# Patient Record
Sex: FEMALE | Race: ASIAN | NOT HISPANIC OR LATINO | Employment: FULL TIME | URBAN - METROPOLITAN AREA
[De-identification: names, ages, dates, MRNs, and addresses within clinical notes are randomized per-mention and may not be internally consistent; named-entity substitution may affect disease eponyms.]

---

## 2020-09-11 ENCOUNTER — EH NON-PROVIDER (OUTPATIENT)
Dept: OCCUPATIONAL MEDICINE | Facility: CLINIC | Age: 40
End: 2020-09-11

## 2020-09-11 ENCOUNTER — HOSPITAL ENCOUNTER (OUTPATIENT)
Facility: MEDICAL CENTER | Age: 40
End: 2020-09-11
Attending: NURSE PRACTITIONER
Payer: COMMERCIAL

## 2020-09-11 ENCOUNTER — EMPLOYEE HEALTH (OUTPATIENT)
Dept: OCCUPATIONAL MEDICINE | Facility: CLINIC | Age: 40
End: 2020-09-11

## 2020-09-11 VITALS
HEIGHT: 62 IN | WEIGHT: 149 LBS | TEMPERATURE: 97.9 F | SYSTOLIC BLOOD PRESSURE: 114 MMHG | OXYGEN SATURATION: 95 % | RESPIRATION RATE: 14 BRPM | DIASTOLIC BLOOD PRESSURE: 76 MMHG | BODY MASS INDEX: 27.42 KG/M2 | HEART RATE: 84 BPM

## 2020-09-11 DIAGNOSIS — Z02.1 PRE-EMPLOYMENT HEALTH SCREENING EXAMINATION: ICD-10-CM

## 2020-09-11 DIAGNOSIS — Z02.1 PRE-EMPLOYMENT HEALTH SCREENING EXAMINATION: Primary | ICD-10-CM

## 2020-09-11 DIAGNOSIS — Z02.1 PRE-EMPLOYMENT DRUG SCREENING: ICD-10-CM

## 2020-09-11 LAB
AMP AMPHETAMINE: NORMAL
BAR BARBITURATES: NORMAL
BZO BENZODIAZEPINES: NORMAL
COC COCAINE: NORMAL
INT CON NEG: NORMAL
INT CON POS: NORMAL
MDMA ECSTASY: NORMAL
MET METHAMPHETAMINES: NORMAL
MTD METHADONE: NORMAL
OPI OPIATES: NORMAL
OXY OXYCODONE: NORMAL
PCP PHENCYCLIDINE: NORMAL
POC URINE DRUG SCREEN OCDRS: NEGATIVE
THC: NORMAL

## 2020-09-11 PROCEDURE — 80305 DRUG TEST PRSMV DIR OPT OBS: CPT | Performed by: NURSE PRACTITIONER

## 2020-09-11 PROCEDURE — 94375 RESPIRATORY FLOW VOLUME LOOP: CPT | Performed by: NURSE PRACTITIONER

## 2020-09-11 PROCEDURE — 8915 PR COMPREHENSIVE PHYSICAL: Performed by: NURSE PRACTITIONER

## 2020-09-11 PROCEDURE — 90707 MMR VACCINE SC: CPT | Performed by: NURSE PRACTITIONER

## 2020-09-11 PROCEDURE — 86480 TB TEST CELL IMMUN MEASURE: CPT | Performed by: NURSE PRACTITIONER

## 2020-09-11 ASSESSMENT — VISUAL ACUITY
OD_CC: 20/20
OS_CC: 20/20

## 2020-09-14 LAB
GAMMA INTERFERON BACKGROUND BLD IA-ACNC: 0.04 IU/ML
M TB IFN-G BLD-IMP: NEGATIVE
M TB IFN-G CD4+ BCKGRND COR BLD-ACNC: 0.04 IU/ML
MITOGEN IGNF BCKGRD COR BLD-ACNC: 6.49 IU/ML
QFT TB2 - NIL TBQ2: 0.02 IU/ML

## 2020-09-21 ENCOUNTER — EH NON-PROVIDER (OUTPATIENT)
Dept: OCCUPATIONAL MEDICINE | Facility: CLINIC | Age: 40
End: 2020-09-21

## 2020-09-21 DIAGNOSIS — Z71.85 IMMUNIZATION COUNSELING: ICD-10-CM

## 2020-09-21 PROCEDURE — 99999 PR NO CHARGE: CPT | Performed by: PREVENTIVE MEDICINE

## 2020-10-02 ENCOUNTER — IMMUNIZATION (OUTPATIENT)
Dept: OCCUPATIONAL MEDICINE | Facility: CLINIC | Age: 40
End: 2020-10-02

## 2020-10-02 DIAGNOSIS — Z23 NEED FOR VACCINATION: ICD-10-CM

## 2020-10-02 PROCEDURE — 90686 IIV4 VACC NO PRSV 0.5 ML IM: CPT | Performed by: PREVENTIVE MEDICINE

## 2020-12-17 DIAGNOSIS — Z23 NEED FOR VACCINATION: ICD-10-CM

## 2020-12-31 ENCOUNTER — IMMUNIZATION (OUTPATIENT)
Dept: FAMILY PLANNING/WOMEN'S HEALTH CLINIC | Facility: IMMUNIZATION CENTER | Age: 40
End: 2020-12-31
Attending: FAMILY MEDICINE

## 2020-12-31 DIAGNOSIS — Z23 ENCOUNTER FOR VACCINATION: Primary | ICD-10-CM

## 2020-12-31 DIAGNOSIS — Z23 NEED FOR VACCINATION: ICD-10-CM

## 2020-12-31 PROCEDURE — 91301 MODERNA SARS-COV-2 VACCINE: CPT

## 2020-12-31 PROCEDURE — 0011A MODERNA SARS-COV-2 VACCINE: CPT

## 2021-01-30 PROCEDURE — 91301 MODERNA SARS-COV-2 VACCINE: CPT

## 2021-01-30 PROCEDURE — 0012A MODERNA SARS-COV-2 VACCINE: CPT

## 2021-01-31 ENCOUNTER — OFFICE VISIT (OUTPATIENT)
Dept: URGENT CARE | Facility: CLINIC | Age: 41
End: 2021-01-31
Payer: COMMERCIAL

## 2021-01-31 ENCOUNTER — IMMUNIZATION (OUTPATIENT)
Dept: FAMILY PLANNING/WOMEN'S HEALTH CLINIC | Facility: IMMUNIZATION CENTER | Age: 41
End: 2021-01-31
Payer: COMMERCIAL

## 2021-01-31 ENCOUNTER — HOSPITAL ENCOUNTER (OUTPATIENT)
Facility: MEDICAL CENTER | Age: 41
End: 2021-01-31
Attending: NURSE PRACTITIONER
Payer: COMMERCIAL

## 2021-01-31 VITALS
BODY MASS INDEX: 26.31 KG/M2 | OXYGEN SATURATION: 97 % | WEIGHT: 143 LBS | SYSTOLIC BLOOD PRESSURE: 122 MMHG | HEIGHT: 62 IN | TEMPERATURE: 98.3 F | RESPIRATION RATE: 16 BRPM | DIASTOLIC BLOOD PRESSURE: 86 MMHG | HEART RATE: 105 BPM

## 2021-01-31 DIAGNOSIS — N76.0 ACUTE VAGINITIS: ICD-10-CM

## 2021-01-31 DIAGNOSIS — R50.9 FEVER, UNSPECIFIED FEVER CAUSE: ICD-10-CM

## 2021-01-31 DIAGNOSIS — R39.9 UTI SYMPTOMS: ICD-10-CM

## 2021-01-31 DIAGNOSIS — J02.9 PHARYNGITIS, UNSPECIFIED ETIOLOGY: ICD-10-CM

## 2021-01-31 DIAGNOSIS — Z23 ENCOUNTER FOR VACCINATION: Primary | ICD-10-CM

## 2021-01-31 DIAGNOSIS — R11.0 NAUSEA: ICD-10-CM

## 2021-01-31 LAB
APPEARANCE UR: CLEAR
BILIRUB UR STRIP-MCNC: NORMAL MG/DL
COLOR UR AUTO: YELLOW
GLUCOSE UR STRIP.AUTO-MCNC: NEGATIVE MG/DL
INT CON NEG: NEGATIVE
INT CON NEG: NORMAL
INT CON POS: NORMAL
INT CON POS: POSITIVE
KETONES UR STRIP.AUTO-MCNC: NEGATIVE MG/DL
LEUKOCYTE ESTERASE UR QL STRIP.AUTO: NEGATIVE
NITRITE UR QL STRIP.AUTO: NEGATIVE
PH UR STRIP.AUTO: 5.5 [PH] (ref 5–8)
POC URINE PREGNANCY TEST: NEGATIVE
PROT UR QL STRIP: NORMAL MG/DL
RBC UR QL AUTO: NORMAL
S PYO AG THROAT QL: NEGATIVE
SP GR UR STRIP.AUTO: 1.03
UROBILINOGEN UR STRIP-MCNC: 0.2 MG/DL

## 2021-01-31 PROCEDURE — 87660 TRICHOMONAS VAGIN DIR PROBE: CPT

## 2021-01-31 PROCEDURE — U0003 INFECTIOUS AGENT DETECTION BY NUCLEIC ACID (DNA OR RNA); SEVERE ACUTE RESPIRATORY SYNDROME CORONAVIRUS 2 (SARS-COV-2) (CORONAVIRUS DISEASE [COVID-19]), AMPLIFIED PROBE TECHNIQUE, MAKING USE OF HIGH THROUGHPUT TECHNOLOGIES AS DESCRIBED BY CMS-2020-01-R: HCPCS

## 2021-01-31 PROCEDURE — 81002 URINALYSIS NONAUTO W/O SCOPE: CPT | Performed by: NURSE PRACTITIONER

## 2021-01-31 PROCEDURE — 87880 STREP A ASSAY W/OPTIC: CPT | Performed by: NURSE PRACTITIONER

## 2021-01-31 PROCEDURE — 87086 URINE CULTURE/COLONY COUNT: CPT

## 2021-01-31 PROCEDURE — U0005 INFEC AGEN DETEC AMPLI PROBE: HCPCS

## 2021-01-31 PROCEDURE — 87510 GARDNER VAG DNA DIR PROBE: CPT

## 2021-01-31 PROCEDURE — 99204 OFFICE O/P NEW MOD 45 MIN: CPT | Performed by: NURSE PRACTITIONER

## 2021-01-31 PROCEDURE — 81025 URINE PREGNANCY TEST: CPT | Performed by: NURSE PRACTITIONER

## 2021-01-31 PROCEDURE — 87077 CULTURE AEROBIC IDENTIFY: CPT

## 2021-01-31 PROCEDURE — 87480 CANDIDA DNA DIR PROBE: CPT

## 2021-01-31 RX ORDER — ONDANSETRON 4 MG/1
4 TABLET, ORALLY DISINTEGRATING ORAL EVERY 6 HOURS PRN
Qty: 10 TAB | Refills: 0 | Status: SHIPPED | OUTPATIENT
Start: 2021-01-31 | End: 2021-03-24

## 2021-01-31 ASSESSMENT — ENCOUNTER SYMPTOMS
NAUSEA: 1
FEVER: 1
SHORTNESS OF BREATH: 0
ABDOMINAL PAIN: 0
BACK PAIN: 1
CHILLS: 1
MYALGIAS: 1
COUGH: 0
FATIGUE: 1
FLANK PAIN: 1
DIARRHEA: 1
RESPIRATORY NEGATIVE: 1
SORE THROAT: 1

## 2021-01-31 ASSESSMENT — VISUAL ACUITY: OU: 1

## 2021-01-31 NOTE — PROGRESS NOTES
"Subjective:     Mariela Kerr is a 40 y.o. female who presents for UTI (x 1 month.  Burning with uriantion, chills, fever (103), R flank pain, \" fishy smell\",  R pelvic pain, urgency and frequency.  Hx of UTI's and BV. ) and Sore Throat (x 1 day, cough, congestion, bodyaches.  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  She received her 2nd shot yesterday. )       UTI  This is a new problem. Episode onset: 1 month ago. The problem has been gradually worsening. Associated symptoms include chills, congestion, fatigue, a fever, myalgias, nausea, a sore throat (Right) and urinary symptoms. Pertinent negatives include no abdominal pain or coughing. Associated symptoms comments: Vaginal odor, R flank pain.   Pharyngitis   This is a new problem. The current episode started today. The problem has been gradually worsening. Associated symptoms include congestion and diarrhea. Pertinent negatives include no abdominal pain, coughing or shortness of breath. Treatments tried: DayQuil/NyQuil.      Patient reports a history of BV as well as kidney infection. Denies concern for STDs.    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    Patient had her second dose of Moderna vaccine yesterday.  Had her first dose 12/31/2020.    Patient had her flu shot 10/2/2020.    Patient was screened prior to rooming and denied COVID-19 diagnosis or contact with a person who has been diagnosed or is suspected to have COVID-19. During this visit, appropriate PPE was worn, hand hygiene was performed, and the patient and any visitors were masked.     PMH:  has no past medical history on file.    MEDS:   Current Outpatient Medications:   •  ondansetron (ZOFRAN ODT) 4 MG TABLET DISPERSIBLE, Take 1 Tab by mouth every 6 hours as needed for Nausea. Dissolve in mouth., Disp: 10 Tab, Rfl: 0    ALLERGIES: No Known Allergies    SURGHX: History reviewed. No pertinent surgical history.    SOCHX:  reports that she has never smoked. She has never " "used smokeless tobacco. She reports current alcohol use. She reports previous drug use.     FH: Reviewed with patient, not pertinent to this visit.    Review of Systems   Constitutional: Positive for chills, fatigue, fever and malaise/fatigue.   HENT: Positive for congestion and sore throat (Right).    Respiratory: Negative.  Negative for cough and shortness of breath.    Gastrointestinal: Positive for diarrhea and nausea. Negative for abdominal pain.   Genitourinary: Positive for dysuria, flank pain (Right), frequency and urgency.        Vaginal odor   Musculoskeletal: Positive for back pain and myalgias.   All other systems reviewed and are negative.    Additional details per HPI.      Objective:     /86   Pulse (!) 105   Temp 36.8 °C (98.3 °F) (Temporal)   Resp 16   Ht 1.575 m (5' 2\")   Wt 64.9 kg (143 lb)   LMP 01/17/2021   SpO2 97%   BMI 26.16 kg/m²     Physical Exam  Vitals signs reviewed.   Constitutional:       General: She is not in acute distress.     Appearance: She is well-developed. She is ill-appearing. She is not toxic-appearing.   HENT:      Head: Normocephalic.      Right Ear: External ear normal.      Left Ear: External ear normal.      Nose: Nose normal.      Mouth/Throat:      Mouth: Mucous membranes are moist.      Pharynx: Oropharynx is clear. Uvula midline.   Eyes:      General: Vision grossly intact.      Extraocular Movements: Extraocular movements intact.      Conjunctiva/sclera: Conjunctivae normal.   Neck:      Musculoskeletal: Normal range of motion.   Cardiovascular:      Rate and Rhythm: Regular rhythm. Tachycardia present.      Pulses: Normal pulses.      Heart sounds: Normal heart sounds.   Pulmonary:      Effort: Pulmonary effort is normal. No respiratory distress.      Breath sounds: Normal breath sounds. No decreased breath sounds, wheezing, rhonchi or rales.   Abdominal:      Palpations: Abdomen is soft.      Tenderness: There is no abdominal tenderness. There is no " right CVA tenderness or left CVA tenderness.   Musculoskeletal: Normal range of motion.         General: No deformity.   Lymphadenopathy:      Cervical: No cervical adenopathy.   Skin:     General: Skin is warm and dry.      Coloration: Skin is not pale.   Neurological:      Mental Status: She is alert and oriented to person, place, and time.      Sensory: No sensory deficit.      Motor: No weakness.      Coordination: Coordination normal.   Psychiatric:         Behavior: Behavior normal. Behavior is cooperative.     UA: moderate blood, small bili, trace protein    POCT pregnancy: negative    Rapid Strep A swab: negative      Assessment/Plan:     1. UTI symptoms  - POCT Urinalysis  - URINE CULTURE(NEW); Future  - POCT PREGNANCY    2. Acute vaginitis  - VAGINAL PATHOGENS DNA PANEL; Future    3. Fever, unspecified fever cause  - COVID/SARS CoV-2 PCR; Future    4. Pharyngitis, unspecified etiology  - POCT Rapid Strep A    5. Nausea  - ondansetron (ZOFRAN ODT) 4 MG TABLET DISPERSIBLE; Take 1 Tab by mouth every 6 hours as needed for Nausea. Dissolve in mouth.  Dispense: 10 Tab; Refill: 0    , otherwise vital signs stable, asymptomatic, no acute distress at this time.     Fever, aches, and fatigue likely related to side effect of 2nd dose of Moderna vaccine yesterday.    Will test for COVID-19. Swab pending. Patient is advised to self-isolate as recommended by the CDC.    The CDC recommends that any person who has symptoms of COVID-19 self-isolates until 1) at least 10 days have elapsed since symptom onset, and 2) at least 24 hours have passed since resolution of any fever without use of fever-reducing medications, and 3) other symptoms have improved.    If results are positive, the health department should be consulted for further instructions. Otherwise, follow the CDC self-isolation criteria stated above.    If results are negative and there is exposure to COVID-19, the CDC recommends self-isolation for 14 days  after last exposure.    If results are negative and there is no exposure to COVID-19, the patient may return to work, school, or regular activities after symptoms have fully resolved for at least 24 hours.    In general, repeat testing is not necessary and is not offered in our urgent cares.     Differential diagnosis, natural history, supportive care, rest, increased fluids, over-the-counter symptom management per 's instructions, ibuprofen, APAP, close monitoring, and indications for immediate follow-up discussed.     Patient advised to: Return for 1) Symptoms that worsen/don't improve, or go to ER, 2) Follow up with primary care in 7-10 days.    Discharge summary provided. Work note provided.    Patient is signed up for [employer] and approves of electronic communications.    All questions answered. Patient agrees with the plan of care.

## 2021-01-31 NOTE — LETTER
January 31, 2021         Patient: Mariela Kerr   YOB: 1980   Date of Visit: 1/31/2021         To Whom it May Concern:    Mariela Kerr was seen in my clinic on 1/31/2021.    COVID-19 test pending. Patient is advised to self-isolate as recommended by the CDC.    The CDC recommends that any person who has symptoms of COVID-19 self-isolates until 1) at least 10 days have elapsed since symptom onset, and 2) at least 24 hours have passed since resolution of any fever without use of fever-reducing medications, and 3) other symptoms have improved.    If results are positive, the health department should be consulted for further instructions. Otherwise, follow the CDC self-isolation criteria stated above.    If results are negative and there is exposure to COVID-19, the CDC recommends self-isolation for 14 days after last exposure.    If results are negative and there is no exposure to COVID-19, the patient may return to work, school, or regular activities after symptoms have fully resolved for at least 24 hours.    In general, repeat testing is not necessary and is not offered in our urgent cares.     If you have any questions or concerns, please don't hesitate to call.        Sincerely,         DAYTON Stern.  Electronically Signed

## 2021-01-31 NOTE — PATIENT INSTRUCTIONS
COVID-19 test pending. Patient is advised to self-isolate as recommended by the CDC.    The CDC recommends that any person who has symptoms of COVID-19 self-isolates until 1) at least 10 days have elapsed since symptom onset, and 2) at least 24 hours have passed since resolution of any fever without use of fever-reducing medications, and 3) other symptoms have improved.    If results are positive, the health department should be consulted for further instructions. Otherwise, follow the CDC self-isolation criteria stated above.    If results are negative and there is exposure to COVID-19, the CDC recommends self-isolation for 14 days after last exposure.    If results are negative and there is no exposure to COVID-19, the patient may return to work, school, or regular activities after symptoms have fully resolved for at least 24 hours.    In general, repeat testing is not necessary and is not offered in our urgent cares.       COVID-19  COVID-19 is a respiratory infection that is caused by a virus called severe acute respiratory syndrome coronavirus 2 (SARS-CoV-2). The disease is also known as coronavirus disease or novel coronavirus. In some people, the virus may not cause any symptoms. In others, it may cause a serious infection. The infection can get worse quickly and can lead to complications, such as:  · Pneumonia, or infection of the lungs.  · Acute respiratory distress syndrome or ARDS. This is fluid build-up in the lungs.  · Acute respiratory failure. This is a condition in which there is not enough oxygen passing from the lungs to the body.  · Sepsis or septic shock. This is a serious bodily reaction to an infection.  · Blood clotting problems.  · Secondary infections due to bacteria or fungus.  The virus that causes COVID-19 is contagious. This means that it can spread from person to person through droplets from coughs and sneezes (respiratory secretions).  What are the causes?  This illness is caused by a  virus. You may catch the virus by:  · Breathing in droplets from an infected person's cough or sneeze.  · Touching something, like a table or a doorknob, that was exposed to the virus (contaminated) and then touching your mouth, nose, or eyes.  What increases the risk?  Risk for infection  You are more likely to be infected with this virus if you:  · Live in or travel to an area with a COVID-19 outbreak.  · Come in contact with a sick person who recently traveled to an area with a COVID-19 outbreak.  · Provide care for or live with a person who is infected with COVID-19.  Risk for serious illness  You are more likely to become seriously ill from the virus if you:  · Are 65 years of age or older.  · Have a long-term disease that lowers your body's ability to fight infection (immunocompromised).  · Live in a nursing home or long-term care facility.  · Have a long-term (chronic) disease such as:  ? Chronic lung disease, including chronic obstructive pulmonary disease or asthma  ? Heart disease.  ? Diabetes.  ? Chronic kidney disease.  ? Liver disease.  · Are obese.  What are the signs or symptoms?  Symptoms of this condition can range from mild to severe. Symptoms may appear any time from 2 to 14 days after being exposed to the virus. They include:  · A fever.  · A cough.  · Difficulty breathing.  · Chills.  · Muscle pains.  · A sore throat.  · Loss of taste or smell.  Some people may also have stomach problems, such as nausea, vomiting, or diarrhea.  Other people may not have any symptoms of COVID-19.  How is this diagnosed?  This condition may be diagnosed based on:  · Your signs and symptoms, especially if:  ? You live in an area with a COVID-19 outbreak.  ? You recently traveled to or from an area where the virus is common.  ? You provide care for or live with a person who was diagnosed with COVID-19.  · A physical exam.  · Lab tests, which may include:  ? A nasal swab to take a sample of fluid from your nose.  ? A  throat swab to take a sample of fluid from your throat.  ? A sample of mucus from your lungs (sputum).  ? Blood tests.  · Imaging tests, which may include, X-rays, CT scan, or ultrasound.  How is this treated?  At present, there is no medicine to treat COVID-19. Medicines that treat other diseases are being used on a trial basis to see if they are effective against COVID-19.  Your health care provider will talk with you about ways to treat your symptoms. For most people, the infection is mild and can be managed at home with rest, fluids, and over-the-counter medicines.  Treatment for a serious infection usually takes places in a hospital intensive care unit (ICU). It may include one or more of the following treatments. These treatments are given until your symptoms improve.  · Receiving fluids and medicines through an IV.  · Supplemental oxygen. Extra oxygen is given through a tube in the nose, a face mask, or a hill.  · Positioning you to lie on your stomach (prone position). This makes it easier for oxygen to get into the lungs.  · Continuous positive airway pressure (CPAP) or bi-level positive airway pressure (BPAP) machine. This treatment uses mild air pressure to keep the airways open. A tube that is connected to a motor delivers oxygen to the body.  · Ventilator. This treatment moves air into and out of the lungs by using a tube that is placed in your windpipe.  · Tracheostomy. This is a procedure to create a hole in the neck so that a breathing tube can be inserted.  · Extracorporeal membrane oxygenation (ECMO). This procedure gives the lungs a chance to recover by taking over the functions of the heart and lungs. It supplies oxygen to the body and removes carbon dioxide.  Follow these instructions at home:  Lifestyle  · If you are sick, stay home except to get medical care. Your health care provider will tell you how long to stay home. Call your health care provider before you go for medical care.  · Rest at  home as told by your health care provider.  · Do not use any products that contain nicotine or tobacco, such as cigarettes, e-cigarettes, and chewing tobacco. If you need help quitting, ask your health care provider.  · Return to your normal activities as told by your health care provider. Ask your health care provider what activities are safe for you.  General instructions  · Take over-the-counter and prescription medicines only as told by your health care provider.  · Drink enough fluid to keep your urine pale yellow.  · Keep all follow-up visits as told by your health care provider. This is important.  How is this prevented?    There is no vaccine to help prevent COVID-19 infection. However, there are steps you can take to protect yourself and others from this virus.  To protect yourself:   · Do not travel to areas where COVID-19 is a risk. The areas where COVID-19 is reported change often. To identify high-risk areas and travel restrictions, check the [facility] travel website: wwwnc.cdc.gov/travel/notices  · If you live in, or must travel to, an area where COVID-19 is a risk, take precautions to avoid infection.  ? Stay away from people who are sick.  ? Wash your hands often with soap and water for 20 seconds. If soap and water are not available, use an alcohol-based hand .  ? Avoid touching your mouth, face, eyes, or nose.  ? Avoid going out in public, follow guidance from your state and local health authorities.  ? If you must go out in public, wear a cloth face covering or face mask.  ? Disinfect objects and surfaces that are frequently touched every day. This may include:  § Counters and tables.  § Doorknobs and light switches.  § Sinks and faucets.  § Electronics, such as phones, remote controls, keyboards, computers, and tablets.  To protect others:  If you have symptoms of COVID-19, take steps to prevent the virus from spreading to others.  · If you think you have a COVID-19 infection, contact your  health care provider right away. Tell your health care team that you think you may have a COVID-19 infection.  · Stay home. Leave your house only to seek medical care. Do not use public transport.  · Do not travel while you are sick.  · Wash your hands often with soap and water for 20 seconds. If soap and water are not available, use alcohol-based hand .  · Stay away from other members of your household. Let healthy household members care for children and pets, if possible. If you have to care for children or pets, wash your hands often and wear a mask. If possible, stay in your own room, separate from others. Use a different bathroom.  · Make sure that all people in your household wash their hands well and often.  · Cough or sneeze into a tissue or your sleeve or elbow. Do not cough or sneeze into your hand or into the air.  · Wear a cloth face covering or face mask.  Where to find more information  · Centers for Disease Control and Prevention: www.cdc.gov/coronavirus/2019-ncov/index.html  · World Health Organization: www.who.int/health-topics/coronavirus  Contact a health care provider if:  · You live in or have traveled to an area where COVID-19 is a risk and you have symptoms of the infection.  · You have had contact with someone who has COVID-19 and you have symptoms of the infection.  Get help right away if:  · You have trouble breathing.  · You have pain or pressure in your chest.  · You have confusion.  · You have bluish lips and fingernails.  · You have difficulty waking from sleep.  · You have symptoms that get worse.  These symptoms may represent a serious problem that is an emergency. Do not wait to see if the symptoms will go away. Get medical help right away. Call your local emergency services (911 in the [place] Do not drive yourself to the hospital. Let the emergency medical personnel know if you think you have COVID-19.  Summary  · COVID-19 is a respiratory infection that is caused by a  virus. It is also known as coronavirus disease or novel coronavirus. It can cause serious infections, such as pneumonia, acute respiratory distress syndrome, acute respiratory failure, or sepsis.  · The virus that causes COVID-19 is contagious. This means that it can spread from person to person through droplets from coughs and sneezes.  · You are more likely to develop a serious illness if you are 65 years of age or older, have a weak immunity, live in a nursing home, or have chronic disease.  · There is no medicine to treat COVID-19. Your health care provider will talk with you about ways to treat your symptoms.  Take steps to protect yourself and others from infection. Wash your hands often and disinfect objects and surfaces that are frequently touched e  Viral Illness, Adult  Viruses are tiny germs that can get into a person's body and cause illness. There are many different types of viruses, and they cause many types of illness. Viral illnesses can range from mild to severe. They can affect various parts of the body.  Common illnesses that are caused by a virus include colds and the flu. Viral illnesses also include serious conditions such as HIV/AIDS (human immunodeficiency virus/acquired immunodeficiency syndrome). A few viruses have been linked to certain cancers.  What are the causes?  Many types of viruses can cause illness. Viruses invade cells in your body, multiply, and cause the infected cells to malfunction or die. When the cell dies, it releases more of the virus. When this happens, you develop symptoms of the illness, and the virus continues to spread to other cells. If the virus takes over the function of the cell, it can cause the cell to divide and grow out of control, as is the case when a virus causes cancer.  Different viruses get into the body in different ways. You can get a virus by:  Swallowing food or water that is contaminated with the virus.  Breathing in droplets that have been coughed  or sneezed into the air by an infected person.  Touching a surface that has been contaminated with the virus and then touching your eyes, nose, or mouth.  Being bitten by an insect or animal that carries the virus.  Having sexual contact with a person who is infected with the virus.  Being exposed to blood or fluids that contain the virus, either through an open cut or during a transfusion.  If a virus enters your body, your body's defense system (immune system) will try to fight the virus. You may be at higher risk for a viral illness if your immune system is weak.  What are the signs or symptoms?  Symptoms vary depending on the type of virus and the location of the cells that it invades. Common symptoms of the main types of viral illnesses include:  Cold and flu viruses  Fever.  Headache.  Sore throat.  Muscle aches.  Nasal congestion.  Cough.  Digestive system (gastrointestinal) viruses  Fever.  Abdominal pain.  Nausea.  Diarrhea.  Liver viruses (hepatitis)  Loss of appetite.  Tiredness.  Yellowing of the skin (jaundice).  Brain and spinal cord viruses  Fever.  Headache.  Stiff neck.  Nausea and vomiting.  Confusion or sleepiness.  Skin viruses  Warts.  Itching.  Rash.  Sexually transmitted viruses  Discharge.  Swelling.  Redness.  Rash.  How is this treated?  Viruses can be difficult to treat because they live within cells. Antibiotic medicines do not treat viruses because these drugs do not get inside cells. Treatment for a viral illness may include:  Resting and drinking plenty of fluids.  Medicines to relieve symptoms. These can include over-the-counter medicine for pain and fever, medicines for cough or congestion, and medicines to relieve diarrhea.  Antiviral medicines. These drugs are available only for certain types of viruses. They may help reduce flu symptoms if taken early. There are also many antiviral medicines for hepatitis and HIV/AIDS.  Some viral illnesses can be prevented with vaccinations. A  common example is the flu shot.  Follow these instructions at home:  Medicines    Take over-the-counter and prescription medicines only as told by your health care provider.  If you were prescribed an antiviral medicine, take it as told by your health care provider. Do not stop taking the medicine even if you start to feel better.  Be aware of when antibiotics are needed and when they are not needed. Antibiotics do not treat viruses. If your health care provider thinks that you may have a bacterial infection as well as a viral infection, you may get an antibiotic.  Do not ask for an antibiotic prescription if you have been diagnosed with a viral illness. That will not make your illness go away faster.  Frequently taking antibiotics when they are not needed can lead to antibiotic resistance. When this develops, the medicine no longer works against the bacteria that it normally fights.  General instructions  Drink enough fluids to keep your urine clear or pale yellow.  Rest as much as possible.  Return to your normal activities as told by your health care provider. Ask your health care provider what activities are safe for you.  Keep all follow-up visits as told by your health care provider. This is important.  How is this prevented?  Take these actions to reduce your risk of viral infection:  Eat a healthy diet and get enough rest.  Wash your hands often with soap and water. This is especially important when you are in public places. If soap and water are not available, use hand .  Avoid close contact with friends and family who have a viral illness.  If you travel to areas where viral gastrointestinal infection is common, avoid drinking water or eating raw food.  Keep your immunizations up to date. Get a flu shot every year as told by your health care provider.  Do not share toothbrushes, nail clippers, razors, or needles with other people.  Always practice safe sex.    Contact a health care provider  if:  You have symptoms of a viral illness that do not go away.  Your symptoms come back after going away.  Your symptoms get worse.  Get help right away if:  You have trouble breathing.  You have a severe headache or a stiff neck.  You have severe vomiting or abdominal pain.  This information is not intended to replace advice given to you by your health care provider. Make sure you discuss any questions you have with your health care provider.  Document Released: 04/28/2017 Document Revised: 11/30/2018 Document Reviewed: 04/28/2017  [employer] Patient Education © 2020 [employer]  · very day. Stay away from people who are sick and wear a mask if you are sick.  This information is not intended to replace advice given to you by your health care provider. Make sure you discuss any questions you have with your health care provider.  Document Released: 01/23/2020 Document Revised: 05/14/2020 Document Reviewed: 01/23/2020  [employer] Patient Education © 2020 [employer]        Vaginitis  Vaginitis is a condition in which the vaginal tissue swells and becomes red (inflamed). This condition is most often caused by a change in the normal balance of bacteria and yeast that live in the vagina. This change causes an overgrowth of certain bacteria or yeast, which causes the inflammation. There are different types of vaginitis, but the most common types are:  · Bacterial vaginosis.  · Yeast infection (candidiasis).  · Trichomoniasis vaginitis. This is a sexually transmitted disease (STD).  · Viral vaginitis.  · Atrophic vaginitis.  · Allergic vaginitis.  What are the causes?  The cause of this condition depends on the type of vaginitis. It can be caused by:  · Bacteria (bacterial vaginosis).  · Yeast, which is a fungus (yeast infection).  · A parasite (trichomoniasis vaginitis).  · A virus (viral vaginitis).  · Low hormone levels (atrophic vaginitis). Low hormone levels can occur during pregnancy, breastfeeding, or after  menopause.  · Irritants, such as bubble baths, scented tampons, and feminine sprays (allergic vaginitis).  Other factors can change the normal balance of the yeast and bacteria that live in the vagina. These include:  · Antibiotic medicines.  · Poor hygiene.  · Diaphragms, vaginal sponges, spermicides, birth control pills, and intrauterine devices (IUD).  · Sex.  · Infection.  · Uncontrolled diabetes.  · A weakened defense (immune) system.  What increases the risk?  This condition is more likely to develop in women who:  · Smoke.  · Use vaginal douches, scented tampons, or scented sanitary pads.  · Wear tight-fitting pants.  · Wear thong underwear.  · Use oral birth control pills or an IUD.  · Have sex without a condom.  · Have multiple sex partners.  · Have an STD.  · Frequently use the spermicide nonoxynol-9.  · Eat lots of foods high in sugar.  · Have uncontrolled diabetes.  · Have low estrogen levels.  · Have a weakened immune system from an immune disorder or medical treatment.  · Are pregnant or breastfeeding.  What are the signs or symptoms?  Symptoms vary depending on the cause of the vaginitis. Common symptoms include:  · Abnormal vaginal discharge.  ? The discharge is white, gray, or yellow with bacterial vaginosis.  ? The discharge is thick, white, and cheesy with a yeast infection.  ? The discharge is frothy and yellow or greenish with trichomoniasis.  · A bad vaginal smell. The smell is fishy with bacterial vaginosis.  · Vaginal itching, pain, or swelling.  · Sex that is painful.  · Pain or burning when urinating.  Sometimes there are no symptoms.  How is this diagnosed?  This condition is diagnosed based on your symptoms and medical history. A physical exam, including a pelvic exam, will also be done. You may also have other tests, including:  · Tests to determine the pH level (acidity or alkalinity) of your vagina.  · A whiff test, to assess the odor that results when a sample of your vaginal  discharge is mixed with a potassium hydroxide solution.  · Tests of vaginal fluid. A sample will be examined under a microscope.  How is this treated?  Treatment varies depending on the type of vaginitis you have. Your treatment may include:  · Antibiotic creams or pills to treat bacterial vaginosis and trichomoniasis.  · Antifungal medicines, such as vaginal creams or suppositories, to treat a yeast infection.  · Medicine to ease discomfort if you have viral vaginitis. Your sexual partner should also be treated.  · Estrogen delivered in a cream, pill, suppository, or vaginal ring to treat atrophic vaginitis. If vaginal dryness occurs, lubricants and moisturizing creams may help. You may need to avoid scented soaps, sprays, or douches.  · Stopping use of a product that is causing allergic vaginitis. Then using a vaginal cream to treat the symptoms.  Follow these instructions at home:  Lifestyle  · Keep your genital area clean and dry. Avoid soap, and only rinse the area with water.  · Do not douche or use tampons until your health care provider says it is okay to do so. Use sanitary pads, if needed.  · Do not have sex until your health care provider approves. When you can return to sex, practice safe sex and use condoms.  · Wipe from front to back. This avoids the spread of bacteria from the rectum to the vagina.  General instructions  · Take over-the-counter and prescription medicines only as told by your health care provider.  · If you were prescribed an antibiotic medicine, take or use it as told by your health care provider. Do not stop taking or using the antibiotic even if you start to feel better.  · Keep all follow-up visits as told by your health care provider. This is important.  How is this prevented?  · Use mild, non-scented products. Do not use things that can irritate the vagina, such as fabric softeners. Avoid the following products if they are scented:  ? Feminine  sprays.  ? Detergents.  ? Tampons.  ? Feminine hygiene products.  ? Soaps or bubble baths.  · Let air reach your genital area.  ? Wear cotton underwear to reduce moisture buildup.  ? Avoid wearing underwear while you sleep.  ? Avoid wearing tight pants and underwear or nylons without a cotton panel.  ? Avoid wearing thong underwear.  · Take off any wet clothing, such as bathing suits, as soon as possible.  · Practice safe sex and use condoms.  Contact a health care provider if:  · You have abdominal pain.  · You have a fever.  · You have symptoms that last for more than 2-3 days.  Get help right away if:  · You have a fever and your symptoms suddenly get worse.  Summary  · Vaginitis is a condition in which the vaginal tissue becomes inflamed.This condition is most often caused by a change in the normal balance of bacteria and yeast that live in the vagina.  · Treatment varies depending on the type of vaginitis you have.  · Do not douche, use tampons , or have sex until your health care provider approves. When you can return to sex, practice safe sex and use condoms.  This information is not intended to replace advice given to you by your health care provider. Make sure you discuss any questions you have with your health care provider.  Document Released: 10/14/2008 Document Revised: 11/30/2018 Document Reviewed: 01/23/2018  [employer] Patient Education © 2020 [employer]

## 2021-02-01 LAB
CANDIDA DNA VAG QL PROBE+SIG AMP: NEGATIVE
COVID ORDER STATUS COVID19: NORMAL
G VAGINALIS DNA VAG QL PROBE+SIG AMP: NEGATIVE
SARS-COV-2 RNA RESP QL NAA+PROBE: NOTDETECTED
SPECIMEN SOURCE: NORMAL
T VAGINALIS DNA VAG QL PROBE+SIG AMP: NEGATIVE

## 2021-02-02 ENCOUNTER — TELEPHONE (OUTPATIENT)
Dept: URGENT CARE | Facility: CLINIC | Age: 41
End: 2021-02-02

## 2021-02-02 ENCOUNTER — PATIENT MESSAGE (OUTPATIENT)
Dept: URGENT CARE | Facility: CLINIC | Age: 41
End: 2021-02-02

## 2021-02-02 DIAGNOSIS — N39.0 COMPLICATED UTI (URINARY TRACT INFECTION): ICD-10-CM

## 2021-02-02 DIAGNOSIS — R82.71 GROUP B STREPTOCOCCAL BACTERIURIA: ICD-10-CM

## 2021-02-02 RX ORDER — CIPROFLOXACIN 500 MG/1
500 TABLET, FILM COATED ORAL 2 TIMES DAILY
Qty: 14 TAB | Refills: 0 | Status: SHIPPED | OUTPATIENT
Start: 2021-02-02 | End: 2021-02-04

## 2021-02-03 LAB
BACTERIA UR CULT: ABNORMAL
BACTERIA UR CULT: ABNORMAL
SIGNIFICANT IND 70042: ABNORMAL
SITE SITE: ABNORMAL
SOURCE SOURCE: ABNORMAL

## 2021-02-03 NOTE — TELEPHONE ENCOUNTER
Attempted to phone patient to discuss preliminary urine culture results.  No answer.  Voicemail left.    Preliminary urine culture results positive for group B strep.  Would like to see how patient is doing.  Recommend starting patient on antibiotic.  Prescription for Cipro sent electronically to [facility] pharmacy on [address]    All other tests were negative.

## 2021-02-04 RX ORDER — AMOXICILLIN 875 MG/1
875 TABLET, COATED ORAL 2 TIMES DAILY
Qty: 20 TAB | Refills: 0 | Status: SHIPPED | OUTPATIENT
Start: 2021-02-04 | End: 2021-02-14

## 2021-03-14 ENCOUNTER — EH NON-PROVIDER (OUTPATIENT)
Dept: OCCUPATIONAL MEDICINE | Facility: CLINIC | Age: 41
End: 2021-03-14

## 2021-03-14 DIAGNOSIS — Z01.89 RESPIRATORY CLEARANCE EXAMINATION, ENCOUNTER FOR: ICD-10-CM

## 2021-03-14 PROCEDURE — 94375 RESPIRATORY FLOW VOLUME LOOP: CPT | Performed by: FAMILY MEDICINE

## 2021-03-24 ENCOUNTER — HOSPITAL ENCOUNTER (EMERGENCY)
Facility: MEDICAL CENTER | Age: 41
End: 2021-03-24
Attending: EMERGENCY MEDICINE
Payer: COMMERCIAL

## 2021-03-24 ENCOUNTER — OFFICE VISIT (OUTPATIENT)
Dept: URGENT CARE | Facility: CLINIC | Age: 41
End: 2021-03-24
Payer: COMMERCIAL

## 2021-03-24 ENCOUNTER — APPOINTMENT (OUTPATIENT)
Dept: RADIOLOGY | Facility: MEDICAL CENTER | Age: 41
End: 2021-03-24
Attending: EMERGENCY MEDICINE
Payer: COMMERCIAL

## 2021-03-24 VITALS
SYSTOLIC BLOOD PRESSURE: 122 MMHG | OXYGEN SATURATION: 99 % | RESPIRATION RATE: 16 BRPM | WEIGHT: 149.69 LBS | HEART RATE: 107 BPM | DIASTOLIC BLOOD PRESSURE: 74 MMHG | BODY MASS INDEX: 27.55 KG/M2 | TEMPERATURE: 99.8 F | HEIGHT: 62 IN

## 2021-03-24 VITALS
BODY MASS INDEX: 26.68 KG/M2 | HEIGHT: 62 IN | HEART RATE: 94 BPM | OXYGEN SATURATION: 99 % | RESPIRATION RATE: 22 BRPM | TEMPERATURE: 98.8 F | WEIGHT: 145 LBS

## 2021-03-24 DIAGNOSIS — N80.03 ABNORMAL UTERINE BLEEDING DUE TO ADENOMYOSIS: ICD-10-CM

## 2021-03-24 DIAGNOSIS — N28.1 CYST OF LEFT KIDNEY: ICD-10-CM

## 2021-03-24 DIAGNOSIS — R10.30 LOWER ABDOMINAL PAIN: ICD-10-CM

## 2021-03-24 DIAGNOSIS — R11.2 NAUSEA AND VOMITING, INTRACTABILITY OF VOMITING NOT SPECIFIED, UNSPECIFIED VOMITING TYPE: ICD-10-CM

## 2021-03-24 DIAGNOSIS — R10.2 PELVIC PAIN: ICD-10-CM

## 2021-03-24 DIAGNOSIS — N93.9 ABNORMAL UTERINE BLEEDING DUE TO ADENOMYOSIS: ICD-10-CM

## 2021-03-24 LAB
ALBUMIN SERPL BCP-MCNC: 4.5 G/DL (ref 3.2–4.9)
ALBUMIN/GLOB SERPL: 1.4 G/DL
ALP SERPL-CCNC: 50 U/L (ref 30–99)
ALT SERPL-CCNC: 15 U/L (ref 2–50)
ANION GAP SERPL CALC-SCNC: 9 MMOL/L (ref 7–16)
APPEARANCE UR: CLEAR
AST SERPL-CCNC: 16 U/L (ref 12–45)
BACTERIA #/AREA URNS HPF: ABNORMAL /HPF
BASOPHILS # BLD AUTO: 0.5 % (ref 0–1.8)
BASOPHILS # BLD: 0.05 K/UL (ref 0–0.12)
BILIRUB SERPL-MCNC: 0.4 MG/DL (ref 0.1–1.5)
BILIRUB UR QL STRIP.AUTO: NEGATIVE
BUN SERPL-MCNC: 16 MG/DL (ref 8–22)
CALCIUM SERPL-MCNC: 9.3 MG/DL (ref 8.4–10.2)
CHLORIDE SERPL-SCNC: 101 MMOL/L (ref 96–112)
CO2 SERPL-SCNC: 26 MMOL/L (ref 20–33)
COLOR UR: YELLOW
CREAT SERPL-MCNC: 0.86 MG/DL (ref 0.5–1.4)
EOSINOPHIL # BLD AUTO: 0.14 K/UL (ref 0–0.51)
EOSINOPHIL NFR BLD: 1.4 % (ref 0–6.9)
EPI CELLS #/AREA URNS HPF: ABNORMAL /HPF
ERYTHROCYTE [DISTWIDTH] IN BLOOD BY AUTOMATED COUNT: 42.9 FL (ref 35.9–50)
GLOBULIN SER CALC-MCNC: 3.2 G/DL (ref 1.9–3.5)
GLUCOSE SERPL-MCNC: 98 MG/DL (ref 65–99)
GLUCOSE UR STRIP.AUTO-MCNC: NEGATIVE MG/DL
HCG SERPL QL: NEGATIVE
HCT VFR BLD AUTO: 49.4 % (ref 37–47)
HGB BLD-MCNC: 16.1 G/DL (ref 12–16)
IMM GRANULOCYTES # BLD AUTO: 0.06 K/UL (ref 0–0.11)
IMM GRANULOCYTES NFR BLD AUTO: 0.6 % (ref 0–0.9)
KETONES UR STRIP.AUTO-MCNC: NEGATIVE MG/DL
LEUKOCYTE ESTERASE UR QL STRIP.AUTO: NEGATIVE
LIPASE SERPL-CCNC: 28 U/L (ref 7–58)
LYMPHOCYTES # BLD AUTO: 0.55 K/UL (ref 1–4.8)
LYMPHOCYTES NFR BLD: 5.4 % (ref 22–41)
MCH RBC QN AUTO: 28 PG (ref 27–33)
MCHC RBC AUTO-ENTMCNC: 32.6 G/DL (ref 33.6–35)
MCV RBC AUTO: 85.8 FL (ref 81.4–97.8)
MICRO URNS: ABNORMAL
MONOCYTES # BLD AUTO: 0.42 K/UL (ref 0–0.85)
MONOCYTES NFR BLD AUTO: 4.1 % (ref 0–13.4)
MUCOUS THREADS #/AREA URNS HPF: ABNORMAL /HPF
NEUTROPHILS # BLD AUTO: 9.04 K/UL (ref 2–7.15)
NEUTROPHILS NFR BLD: 88 % (ref 44–72)
NITRITE UR QL STRIP.AUTO: NEGATIVE
NRBC # BLD AUTO: 0 K/UL
NRBC BLD-RTO: 0 /100 WBC
PH UR STRIP.AUTO: 7 [PH] (ref 5–8)
PLATELET # BLD AUTO: 360 K/UL (ref 164–446)
PMV BLD AUTO: 9.5 FL (ref 9–12.9)
POTASSIUM SERPL-SCNC: 4.2 MMOL/L (ref 3.6–5.5)
PROT SERPL-MCNC: 7.7 G/DL (ref 6–8.2)
PROT UR QL STRIP: NEGATIVE MG/DL
RBC # BLD AUTO: 5.76 M/UL (ref 4.2–5.4)
RBC # URNS HPF: ABNORMAL /HPF
RBC UR QL AUTO: ABNORMAL
SODIUM SERPL-SCNC: 136 MMOL/L (ref 135–145)
SP GR UR STRIP.AUTO: 1.01
WBC # BLD AUTO: 10.3 K/UL (ref 4.8–10.8)
WBC #/AREA URNS HPF: ABNORMAL /HPF

## 2021-03-24 PROCEDURE — 99285 EMERGENCY DEPT VISIT HI MDM: CPT

## 2021-03-24 PROCEDURE — 84703 CHORIONIC GONADOTROPIN ASSAY: CPT

## 2021-03-24 PROCEDURE — 80053 COMPREHEN METABOLIC PANEL: CPT

## 2021-03-24 PROCEDURE — 74177 CT ABD & PELVIS W/CONTRAST: CPT

## 2021-03-24 PROCEDURE — 700105 HCHG RX REV CODE 258: Performed by: EMERGENCY MEDICINE

## 2021-03-24 PROCEDURE — 85025 COMPLETE CBC W/AUTO DIFF WBC: CPT

## 2021-03-24 PROCEDURE — 83690 ASSAY OF LIPASE: CPT

## 2021-03-24 PROCEDURE — 700117 HCHG RX CONTRAST REV CODE 255: Performed by: EMERGENCY MEDICINE

## 2021-03-24 PROCEDURE — 99213 OFFICE O/P EST LOW 20 MIN: CPT | Performed by: PHYSICIAN ASSISTANT

## 2021-03-24 PROCEDURE — 700111 HCHG RX REV CODE 636 W/ 250 OVERRIDE (IP): Performed by: EMERGENCY MEDICINE

## 2021-03-24 PROCEDURE — 96374 THER/PROPH/DIAG INJ IV PUSH: CPT

## 2021-03-24 PROCEDURE — 81001 URINALYSIS AUTO W/SCOPE: CPT

## 2021-03-24 PROCEDURE — 96375 TX/PRO/DX INJ NEW DRUG ADDON: CPT

## 2021-03-24 PROCEDURE — 36415 COLL VENOUS BLD VENIPUNCTURE: CPT

## 2021-03-24 RX ORDER — MORPHINE SULFATE 4 MG/ML
4 INJECTION, SOLUTION INTRAMUSCULAR; INTRAVENOUS ONCE
Status: COMPLETED | OUTPATIENT
Start: 2021-03-24 | End: 2021-03-24

## 2021-03-24 RX ORDER — KETOROLAC TROMETHAMINE 30 MG/ML
30 INJECTION, SOLUTION INTRAMUSCULAR; INTRAVENOUS ONCE
Status: COMPLETED | OUTPATIENT
Start: 2021-03-24 | End: 2021-03-24

## 2021-03-24 RX ORDER — HYDROCODONE BITARTRATE AND ACETAMINOPHEN 5; 325 MG/1; MG/1
1 TABLET ORAL EVERY 6 HOURS PRN
Qty: 10 TABLET | Refills: 0 | Status: SHIPPED | OUTPATIENT
Start: 2021-03-24 | End: 2021-03-27

## 2021-03-24 RX ORDER — SODIUM CHLORIDE, SODIUM LACTATE, POTASSIUM CHLORIDE, CALCIUM CHLORIDE 600; 310; 30; 20 MG/100ML; MG/100ML; MG/100ML; MG/100ML
1000 INJECTION, SOLUTION INTRAVENOUS ONCE
Status: COMPLETED | OUTPATIENT
Start: 2021-03-24 | End: 2021-03-24

## 2021-03-24 RX ORDER — ONDANSETRON 4 MG/1
4 TABLET, ORALLY DISINTEGRATING ORAL ONCE
Status: COMPLETED | OUTPATIENT
Start: 2021-03-24 | End: 2021-03-24

## 2021-03-24 RX ORDER — ONDANSETRON 2 MG/ML
4 INJECTION INTRAMUSCULAR; INTRAVENOUS ONCE
Status: COMPLETED | OUTPATIENT
Start: 2021-03-24 | End: 2021-03-24

## 2021-03-24 RX ADMIN — MORPHINE SULFATE 4 MG: 4 INJECTION INTRAVENOUS at 18:13

## 2021-03-24 RX ADMIN — IOHEXOL 25 ML: 240 INJECTION, SOLUTION INTRATHECAL; INTRAVASCULAR; INTRAVENOUS; ORAL at 18:39

## 2021-03-24 RX ADMIN — KETOROLAC TROMETHAMINE 30 MG: 30 INJECTION, SOLUTION INTRAMUSCULAR at 19:51

## 2021-03-24 RX ADMIN — ONDANSETRON 4 MG: 4 TABLET, ORALLY DISINTEGRATING ORAL at 19:51

## 2021-03-24 RX ADMIN — SODIUM CHLORIDE, POTASSIUM CHLORIDE, SODIUM LACTATE AND CALCIUM CHLORIDE 1000 ML: 600; 310; 30; 20 INJECTION, SOLUTION INTRAVENOUS at 18:13

## 2021-03-24 RX ADMIN — ONDANSETRON 4 MG: 2 INJECTION INTRAMUSCULAR; INTRAVENOUS at 18:12

## 2021-03-24 RX ADMIN — ONDANSETRON 4 MG: 4 TABLET, ORALLY DISINTEGRATING ORAL at 15:29

## 2021-03-24 RX ADMIN — IOHEXOL 100 ML: 350 INJECTION, SOLUTION INTRAVENOUS at 18:39

## 2021-03-24 ASSESSMENT — ENCOUNTER SYMPTOMS
HEADACHES: 0
VOMITING: 1
NAUSEA: 1
SORE THROAT: 0
MYALGIAS: 0
SHORTNESS OF BREATH: 0
EYE PAIN: 0
CONSTIPATION: 0
DIZZINESS: 1
FEVER: 0
COUGH: 0
DIARRHEA: 0
CHILLS: 0
ABDOMINAL PAIN: 1

## 2021-03-24 NOTE — PROGRESS NOTES
"Subjective:   Mariela Kerr is a 40 y.o. female who presents for Nausea (Gastric pain [id number] for nausea worse today)      HPI:  This a 40-year-old female who presents to urgent care complaining of 1 month of mild nausea which became acutely worse several hours ago and she has had intractable vomiting since.  She has a history of endometriosis and reports that she has had ongoing vaginal bleeding for around 1 month as well as some lower abdominal cramping.  Her abdominal pain pattern changed today and she notes more significant bloating and diffuse lower abdominal pain which is quite tender.  She has felt feverish but not demonstrated any fevers.  She has ongoing vertigo.    Review of Systems   Constitutional: Positive for malaise/fatigue. Negative for chills and fever.   HENT: Negative for congestion, ear pain and sore throat.    Eyes: Negative for pain.   Respiratory: Negative for cough and shortness of breath.    Cardiovascular: Negative for chest pain.   Gastrointestinal: Positive for abdominal pain, nausea and vomiting. Negative for constipation and diarrhea.   Genitourinary: Negative for dysuria.   Musculoskeletal: Negative for myalgias.   Skin: Negative for rash.   Neurological: Positive for dizziness. Negative for headaches.       Medications:    • This patient does not have an active medication from one of the medication groupers.    Allergies: Patient has no known allergies.    Problem List: Mariela Kerr does not have a problem list on file.    Surgical History:  No past surgical history on file.    Past Social Hx: Mariela Kerr  reports that she has never smoked. She has never used smokeless tobacco. She reports current alcohol use. She reports previous drug use.     Past Family Hx:  Mariela Kerr family history is not on file.     Problem list, medications, and allergies reviewed by myself today in Epic.     Objective:     Pulse 94   Temp 37.1 °C (98.8 °F)   Resp (!) 22   Ht 1.575 m (5' 2\")  "  Wt 65.8 kg (145 lb)   SpO2 99%   BMI 26.52 kg/m²     Physical Exam  Vitals reviewed.   Constitutional:       Appearance: Normal appearance. She is ill-appearing.      Comments: Actively dry heaving and vomiting during exam, after Zofran no change in this   HENT:      Head: Normocephalic and atraumatic.      Right Ear: External ear normal.      Left Ear: External ear normal.      Nose: Nose normal.      Mouth/Throat:      Mouth: Mucous membranes are moist.   Eyes:      Conjunctiva/sclera: Conjunctivae normal.   Cardiovascular:      Rate and Rhythm: Normal rate.   Pulmonary:      Effort: Pulmonary effort is normal.   Abdominal:      Comments: Diffuse lower abdominal tenderness.  Patient wants to lay in a fetal position as a position of comfort and is very uncomfortable laying flat   Skin:     General: Skin is warm and dry.      Capillary Refill: Capillary refill takes less than 2 seconds.   Neurological:      Mental Status: She is alert and oriented to person, place, and time.         Assessment/Plan:     Diagnosis and associated orders:     1. Nausea and vomiting, intractability of vomiting not specified, unspecified vomiting type  ondansetron (ZOFRAN ODT) dispertab 4 mg   2. Lower abdominal pain        Comments/MDM:     • Patient showed no improvement after administration of Zofran and 15-minute period of monitoring.  Her history and physical are concerning for electrolyte disturbance or an acute intra-abdominal pathology.  I have referred to the ER for higher level of care.  We offered an ambulance however she would like to go by private vehicle.         Differential diagnosis, natural history, supportive care, and indications for immediate follow-up discussed.    Advised the patient to follow-up with the primary care physician for recheck, reevaluation, and consideration of further management.    Please note that this dictation was created using voice recognition software. I have made a reasonable attempt to  correct obvious errors, but I expect that there are errors of grammar and possibly content that I did not discover before finalizing the note.    This note was electronically signed by Evaristo Montejo PA-C

## 2021-03-24 NOTE — ED TRIAGE NOTES
"Chief Complaint   Patient presents with   • Vaginal Bleeding     past 3 months, hx of endometriosis, pt takes iron once/week   • Abdominal Pain     sharp pains, bloating, nausea, and gas pain started today      /84   Pulse 60   Temp 36.8 °C (98.3 °F) (Temporal)   Resp 18   Ht 1.575 m (5' 2\")   Wt 67.9 kg (149 lb 11.1 oz)   SpO2 93%   BMI 27.38 kg/m²     Pt arrived w/ above concern, was seen at  PTA, was given zofran sublingual that did not help. Pt states she has a fibroid that \"may have popped\"    Pt states her abd symptoms come in waves, gets \"increased nausea when the pain hits\"    COVID screen negative. Mask in place   "

## 2021-03-25 NOTE — ED NOTES
Dc instructions and medications discussed with patient at bedside. All questions answered at this time. VSS. No IV in place at this time. Pt to lobby without incident. friend to drive patient home.

## 2021-03-25 NOTE — ED PROVIDER NOTES
ED Provider Note    CHIEF COMPLAINT   Chief Complaint   Patient presents with   • Vaginal Bleeding     past 3 months, hx of endometriosis, pt takes iron once/week   • Abdominal Pain     sharp pains, bloating, nausea, and gas pain started today        HPI   Mariela Kerr is a 40 y.o. female who presents planing of diffuse abdominal pain bloating nausea vomiting and discomfort that started around 1:00 this afternoon.  She states that her symptoms are getting progressively worse.  She has a history of endometriosis and anemia and does take iron once a week.  She is not currently taking any medications for her endometriosis.  She has not yet established with a primary care doctor or gynecologist in [place].  All of her previous surgeries for her endometriosis were in 2017 in [place].  She denies possibility of pregnancy.  She says she has chills but no fever.  Prior to being roomed she vomited about a liter of fluid.  She has not had any diarrhea.  She states she has been passing gas today up until now.  She denies any chest pain sore throat or shortness of breath.  The patient states that she has had vaginal bleeding every day for the last 3 months.    REVIEW OF SYSTEMS    HEENT:  No ear pain, congestion or sore throat   EYES: no discharge redness or vision changes  CARDIAC: no chest pain, palpitations    PULMONARY: no dyspnea, cough or congestion   GI: See history of present illness  : no dysuria, back pain or hematuria   Neuro: no weakness, numbness aphasia or headache  Musculoskeletal: no swelling deformity or pain no joint swelling  Endocrine: no fevers, sweating, weight loss   SKIN: no rash, erythema or contusions     See history of present illness all other systems are negative    PAST MEDICAL HISTORY  No past medical history on file.    FAMILY HISTORY  History reviewed. No pertinent family history.    SOCIAL HISTORY  Social History     Socioeconomic History   • Marital status: Single     Spouse name: Not on  "file   • Number of children: Not on file   • Years of education: Not on file   • Highest education level: Not on file   Occupational History   • Not on file   Tobacco Use   • Smoking status: Never Smoker   • Smokeless tobacco: Never Used   Substance and Sexual Activity   • Alcohol use: Yes     Comment: Rarely   • Drug use: Not Currently   • Sexual activity: Not on file   Other Topics Concern   • Not on file   Social History Narrative   • Not on file     Social Determinants of Health     Financial Resource Strain:    • Difficulty of Paying Living Expenses:    Food Insecurity:    • Worried About Running Out of Food in the Last Year:    • Ran Out of Food in the Last Year:    Transportation Needs:    • Lack of Transportation (Medical):    • Lack of Transportation (Non-Medical):    Physical Activity:    • Days of Exercise per Week:    • Minutes of Exercise per Session:    Stress:    • Feeling of Stress :    Social Connections:    • Frequency of Communication with Friends and Family:    • Frequency of Social Gatherings with Friends and Family:    • Attends Baptism Services:    • Active Member of Clubs or Organizations:    • Attends Club or Organization Meetings:    • Marital Status:    Intimate Partner Violence:    • Fear of Current or Ex-Partner:    • Emotionally Abused:    • Physically Abused:    • Sexually Abused:        SURGICAL HISTORY  No past surgical history on file.    CURRENT MEDICATIONS  Home Medications    **Home medications have not yet been reviewed for this encounter**         ALLERGIES  No Known Allergies    PHYSICAL EXAM  VITAL SIGNS: /84   Pulse 60   Temp 36.8 °C (98.3 °F) (Temporal)   Resp 18   Ht 1.575 m (5' 2\")   Wt 67.9 kg (149 lb 11.1 oz)   SpO2 93%   BMI 27.38 kg/m²       Constitutional: Well developed, Well nourished, uncomfortable appearing rubbing stomach  HENT: Normocephalic, Atraumatic, Bilateral external ears normal, Oropharynx dry, No oral exudates, Nose normal.   Eyes: " PERRLA, EOMI, Conjunctiva normal, No discharge.   Neck: Normal range of motion, No tenderness, Supple, No stridor.   Lymphatic: No lymphadenopathy noted.   Cardiovascular: Tachycardic, Normal rhythm, No murmurs, No rubs, No gallops.   Thorax & Lungs: Normal breath sounds, No respiratory distress, No wheezing, No chest tenderness.   Abdomen: Decreased bowel sounds with mild distention diffuse tenderness to palpation in all quadrants worse in the bilateral lower quadrants  Skin: Warm, Dry, No erythema, No rash.   Back: No tenderness, No CVA tenderness.   Extremities: Intact distal pulses, No edema, No tenderness, No cyanosis, No clubbing.   Musculoskeletal: Good range of motion in all major joints. No tenderness to palpation or major deformities noted.   Neurologic: Alert & oriented x 3, Normal motor function, Normal sensory function, No focal deficits noted.   Psychiatric: Affect normal, Judgment normal, Mood normal.         RADIOLOGY/PROCEDURES  CT-ABDOMEN-PELVIS WITH   Final Result         Small amount of nonspecific free fluid in the pelvis.      Enlarged and heterogeneous uterus can be seen in the setting of fibroids and adenomyosis. Further evaluation with pelvic ultrasound is recommended.      1.3 cm hypodense left renal lesion may represent a hemorrhagic/proteinaceous cyst. Further evaluation with renal ultrasound is recommended. Tiny hypodense right renal lesion is too small to characterize.               COURSE & MEDICAL DECISION MAKING  Pertinent Labs & Imaging studies reviewed. (See chart for details)  Differential diagnosis: Small bowel obstruction, ruptured ovarian cyst, colitis, diverticulitis, pancreatitis, pyelonephritis    Results for orders placed or performed during the hospital encounter of 03/24/21   CBC WITH DIFFERENTIAL   Result Value Ref Range    WBC 10.3 4.8 - 10.8 K/uL    RBC 5.76 (H) 4.20 - 5.40 M/uL    Hemoglobin 16.1 (H) 12.0 - 16.0 g/dL    Hematocrit 49.4 (H) 37.0 - 47.0 %    MCV 85.8  81.4 - 97.8 fL    MCH 28.0 27.0 - 33.0 pg    MCHC 32.6 (L) 33.6 - 35.0 g/dL    RDW 42.9 35.9 - 50.0 fL    Platelet Count 360 164 - 446 K/uL    MPV 9.5 9.0 - 12.9 fL    Neutrophils-Polys 88.00 (H) 44.00 - 72.00 %    Lymphocytes 5.40 (L) 22.00 - 41.00 %    Monocytes 4.10 0.00 - 13.40 %    Eosinophils 1.40 0.00 - 6.90 %    Basophils 0.50 0.00 - 1.80 %    Immature Granulocytes 0.60 0.00 - 0.90 %    Nucleated RBC 0.00 /100 WBC    Neutrophils (Absolute) 9.04 (H) 2.00 - 7.15 K/uL    Lymphs (Absolute) 0.55 (L) 1.00 - 4.80 K/uL    Monos (Absolute) 0.42 0.00 - 0.85 K/uL    Eos (Absolute) 0.14 0.00 - 0.51 K/uL    Baso (Absolute) 0.05 0.00 - 0.12 K/uL    Immature Granulocytes (abs) 0.06 0.00 - 0.11 K/uL    NRBC (Absolute) 0.00 K/uL   COMP METABOLIC PANEL   Result Value Ref Range    Sodium 136 135 - 145 mmol/L    Potassium 4.2 3.6 - 5.5 mmol/L    Chloride 101 96 - 112 mmol/L    Co2 26 20 - 33 mmol/L    Anion Gap 9.0 7.0 - 16.0    Glucose 98 65 - 99 mg/dL    Bun 16 8 - 22 mg/dL    Creatinine 0.86 0.50 - 1.40 mg/dL    Calcium 9.3 8.4 - 10.2 mg/dL    AST(SGOT) 16 12 - 45 U/L    ALT(SGPT) 15 2 - 50 U/L    Alkaline Phosphatase 50 30 - 99 U/L    Total Bilirubin 0.4 0.1 - 1.5 mg/dL    Albumin 4.5 3.2 - 4.9 g/dL    Total Protein 7.7 6.0 - 8.2 g/dL    Globulin 3.2 1.9 - 3.5 g/dL    A-G Ratio 1.4 g/dL   LIPASE   Result Value Ref Range    Lipase 28 7 - 58 U/L   URINALYSIS,CULTURE IF INDICATED    Specimen: Urine   Result Value Ref Range    Color Yellow     Character Clear     Specific Gravity 1.015 <1.035    Ph 7.0 5.0 - 8.0    Glucose Negative Negative mg/dL    Ketones Negative Negative mg/dL    Protein Negative Negative mg/dL    Bilirubin Negative Negative    Nitrite Negative Negative    Leukocyte Esterase Negative Negative    Occult Blood Moderate (A) Negative    Micro Urine Req Microscopic    HCG QUAL SERUM   Result Value Ref Range    Beta-Hcg Qualitative Serum Negative Negative   ESTIMATED GFR   Result Value Ref Range    GFR If   American >60 >60 mL/min/1.73 m 2    GFR If Non African American >60 >60 mL/min/1.73 m 2   URINE MICROSCOPIC (W/UA)   Result Value Ref Range    WBC 0-2 /hpf    RBC 5-10 (A) /hpf    Bacteria Few (A) None /hpf    Epithelial Cells Few Few /hpf    Mucous Threads Few /hpf        HYDRATION: Based on the patient's presentation of Acute Vomiting the patient was given IV fluids. IV Hydration was used because oral hydration was not adequate alone. Upon recheck following hydration, the patient was improved.      6:07 PM an IV was started and labs were drawn.  I gave the patient morphine and Zofran for pain and nausea as well as a liter of lactated Ringer's for rehydration.  Patient has been made n.p.o.      7:39 PM the patient CT is negative for small bowel obstruction or colitis.  Her labs are unremarkable and she is not anemic at this time.  She does have a very enlarged uterus consistent with uterine fibroids.  She also has an incidental cyst on her left kidney that I told her about as well.  The patient has it in appointment with her primary care physician on April 2 which I advised her to keep.  I will also refer to the pregnancy center so she can see a gynecologist about a possible hysterectomy versus D&C for her uterine fibroids.  If she has worsening bleeding dizziness fevers or worsening pain she should go to [facility] where GYN is readily available.  Discharge her home with pain medication and nausea medication.    I reviewed prescription monitoring program for patient's narcotic use before prescribing a scheduled drug.The patient will not drink alcohol nor drive with prescribed medications. The patient will return for new or worsening symptoms and is stable at the time of discharge.    The patient is referred to a primary physician for blood pressure management, diabetic screening, and for all other preventative health concerns.    In prescribing controlled substances to this patient, I certify  that I have obtained and reviewed the medical history of Mariela Kerr. I have also made a good alesia effort to obtain applicable records from other providers who have treated the patient and records did not demonstrate any increased risk of substance abuse that would prevent me from prescribing controlled substances.     I have conducted a physical exam and documented it. I have reviewed Ms. Kerr’s prescription history as maintained by the [place] Prescription Monitoring Program.     I have assessed the patient’s risk for abuse, dependency, and addiction using the validated Opioid Risk Tool available at https://www.mdcalc.com/iqoydz-quzw-cqbg-ort-narcotic-abuse.     Given the above, I believe the benefits of controlled substance therapy outweigh the risks. The reasons for prescribing controlled substances include non-narcotic, oral analgesic alternatives have been inadequate for pain control. Accordingly, I have discussed the risk and benefits, treatment plan, and alternative therapies with the patient.         DISPOSITION:  Patient will be discharged home in stable condition.    FOLLOW UP:  Gertrude Collins, A.P.R.N.  [address]  [address]  [place] [id number] [phone]      keep appointment on 4/2/21    Pregnancy Lasha Mcallister M.D.  [address] [place] [address]  [phone]    Call in 2 days  for recheck, to establish care      OUTPATIENT MEDICATIONS:  New Prescriptions    HYDROCODONE-ACETAMINOPHEN (NORCO) 5-325 MG TAB PER TABLET    Take 1 tablet by mouth every 6 hours as needed for up to 3 days.           FINAL IMPRESSION  1. Abnormal uterine bleeding due to adenomyosis  HYDROcodone-acetaminophen (NORCO) 5-325 MG Tab per tablet   2. Pelvic pain  HYDROcodone-acetaminophen (NORCO) 5-325 MG Tab per tablet   3. Cyst of left kidney             Electronically signed by: Maryam Carmona M.D., 3/24/2021 5:50 PM

## 2021-04-02 ENCOUNTER — TELEMEDICINE (OUTPATIENT)
Dept: MEDICAL GROUP | Facility: MEDICAL CENTER | Age: 41
End: 2021-04-02
Payer: COMMERCIAL

## 2021-04-02 VITALS
BODY MASS INDEX: 26.5 KG/M2 | OXYGEN SATURATION: 99 % | WEIGHT: 144 LBS | HEIGHT: 62 IN | HEART RATE: 73 BPM | TEMPERATURE: 97.7 F | SYSTOLIC BLOOD PRESSURE: 116 MMHG | DIASTOLIC BLOOD PRESSURE: 89 MMHG

## 2021-04-02 DIAGNOSIS — D21.9 FIBROIDS: ICD-10-CM

## 2021-04-02 DIAGNOSIS — D48.9 NEOPLASM OF UNCERTAIN BEHAVIOR: ICD-10-CM

## 2021-04-02 DIAGNOSIS — Z83.3 FAMILY HISTORY OF DIABETES MELLITUS: ICD-10-CM

## 2021-04-02 DIAGNOSIS — Z12.31 ENCOUNTER FOR SCREENING MAMMOGRAM FOR BREAST CANCER: ICD-10-CM

## 2021-04-02 DIAGNOSIS — Z12.31 SCREENING MAMMOGRAM, ENCOUNTER FOR: ICD-10-CM

## 2021-04-02 DIAGNOSIS — Z00.00 ANNUAL PHYSICAL EXAM: ICD-10-CM

## 2021-04-02 DIAGNOSIS — G89.29 CHRONIC PAIN OF RIGHT ANKLE: ICD-10-CM

## 2021-04-02 DIAGNOSIS — M25.571 CHRONIC PAIN OF RIGHT ANKLE: ICD-10-CM

## 2021-04-02 PROBLEM — N80.9 ENDOMETRIOSIS: Status: ACTIVE | Noted: 2021-04-02

## 2021-04-02 PROBLEM — Z87.42 H/O MENORRHAGIA: Status: ACTIVE | Noted: 2021-04-02

## 2021-04-02 PROCEDURE — 99214 OFFICE O/P EST MOD 30 MIN: CPT | Performed by: NURSE PRACTITIONER

## 2021-04-02 RX ORDER — M-VIT,TX,IRON,MINS/CALC/FOLIC 27MG-0.4MG
1 TABLET ORAL DAILY
COMMUNITY

## 2021-04-02 RX ORDER — LORATADINE 10 MG/1
CAPSULE, LIQUID FILLED ORAL
COMMUNITY

## 2021-04-02 ASSESSMENT — FIBROSIS 4 INDEX: FIB4 SCORE: 0.46

## 2021-04-02 ASSESSMENT — PATIENT HEALTH QUESTIONNAIRE - PHQ9: CLINICAL INTERPRETATION OF PHQ2 SCORE: 0

## 2021-04-02 NOTE — ASSESSMENT & PLAN NOTE
Has had small, hyperpigmented area on face for several years, has noticed that it has become larger. No pain, scabbing, or bleeding. She would like to have this evaluated with a dermatologist.

## 2021-04-02 NOTE — PROGRESS NOTES
Virtual Visit: New Patient   This visit was conducted via Zoom using secure and encrypted videoconferencing technology. The patient was in a private location in the state of [place].    The patient's identity was confirmed and verbal consent was obtained for this virtual visit.    Subjective:     CC:   Chief Complaint   Patient presents with   • Establish Care   • Fibroids   • Ankle Pain   • Referral Needed     Dermatology       Mariela Kerr is a 40 y.o. female presenting to establish care and to discuss the evaluation and management of:      Fibroids  Myometomy in 2017. Had recurrence in 2019. Recently established with GYN, planning for endometrial biopsy with [facility].     Chronic pain of right ankle  Started 6-8 months ago, has some sensitivity/pain to touch of malleolus. Located over vein. No redness, swelling, hot to touch.     Has tried tylenol and topical vicks without improvement.      Neoplasm of uncertain behavior  Has had small, hyperpigmented area on face for several years, has noticed that it has become larger. No pain, scabbing, or bleeding. She would like to have this evaluated with a dermatologist.        ROS  See HPI  Constitutional: Negative for fever, chills and malaise/fatigue.   HENT: Negative for congestion.    Eyes: Negative for pain.   Respiratory: Negative for cough and shortness of breath.    Cardiovascular: Negative for leg swelling.   Gastrointestinal: Negative for nausea, vomiting, abdominal pain and diarrhea.   Genitourinary: Negative for dysuria and hematuria.   Skin: Negative for rash.   Neurological: Negative for dizziness, focal weakness and headaches.   Endo/Heme/Allergies: Does not bruise/bleed easily.   Psychiatric/Behavioral: Negative for depression.  The patient is not nervous/anxious.      Allergies   Allergen Reactions   • Gluten Meal Hives, Itching, Rash and Swelling   • Lactose    • Whey        Current medicines (including changes today)  Current Outpatient Medications  "  Medication Sig Dispense Refill   • therapeutic multivitamin-minerals (THERAGRAN-M) Tab Take 1 tablet by mouth every day.     • Loratadine (CLARITIN) 10 MG Cap Take  by mouth.       No current facility-administered medications for this visit.       She  has no past medical history on file.  She  has a past surgical history that includes myomectomy.      Family History   Problem Relation Age of Onset   • Diabetes Mother    • Hypertension Mother    • Heart Disease Mother         ablation   • Heart Disease Father         ablation   • Kidney Disease Father    • Hypertension Father    • Hyperlipidemia Father    • Cancer Maternal Aunt         breast   • Cancer Maternal Grandmother         breast cancer     Family Status   Relation Name Status   • Mo  Alive   • Fa  Alive   • MAunt     • MGMo  (Not Specified)       Patient Active Problem List    Diagnosis Date Noted   • Fibroids 2021   • Endometriosis 2021   • H/O menorrhagia 2021   • Chronic pain of right ankle 2021   • Family history of diabetes mellitus 2021   • Neoplasm of uncertain behavior 2021   • Cystic acne 2013          Objective:   /89   Pulse 73   Temp 36.5 °C (97.7 °F) (Temporal)   Ht 1.575 m (5' 2\")   Wt 65.3 kg (144 lb)   SpO2 99%   BMI 26.34 kg/m²     Physical Exam:  Constitutional: Alert, no distress, well-groomed.  Skin: No rashes in visible areas.  Eye: Round. Conjunctiva clear, lids normal. No icterus.   ENMT: Lips pink without lesions, good dentition, moist mucous membranes. Phonation normal.  Neck: No masses, no thyromegaly. Moves freely without pain.  Respiratory: Unlabored respiratory effort, no cough or audible wheeze  Psych: Alert and oriented x3, normal affect and mood.       Assessment and Plan:   The following treatment plan was discussed:     1. Fibroids  Unstable  Continue follow up and eval with GYN    2. Neoplasm of uncertain behavior  Unstable  Follow up with derm for eval  - " REFERRAL TO DERMATOLOGY    3. Chronic pain of right ankle  Unstable  Possibly an irritated nerve  Advised OTC voltaren gel twice daily for a week to see if symptoms improve.    4. Screening mammogram, encounter for  - MA-SCREENING MAMMO BILAT W/CAD; Future    5. Annual physical exam  - Lipid Profile; Future  - TSH WITH REFLEX TO FT4; Future  - VITAMIN D,25 HYDROXY; Future  - HEMOGLOBIN A1C; Future    6. Family history of diabetes mellitus  - HEMOGLOBIN A1C; Future    Other orders  - therapeutic multivitamin-minerals (THERAGRAN-M) Tab; Take 1 tablet by mouth every day.  - Loratadine (CLARITIN) 10 MG Cap; Take  by mouth.        Follow-up: Return in about 1 year (around 4/2/2022).         I have placed the below orders and discussed them with an approved delegating provider. The MA is performing the below orders under the direction of Dr. Kumar

## 2021-04-02 NOTE — ASSESSMENT & PLAN NOTE
Myometomy in 2017. Had recurrence in 2019. Recently established with GYN, planning for endometrial biopsy with [facility].

## 2021-04-02 NOTE — ASSESSMENT & PLAN NOTE
Started 6-8 months ago, has some sensitivity/pain to touch of malleolus. Located over vein. No redness, swelling, hot to touch.     Has tried tylenol and topical vicks without improvement.

## 2021-06-11 ENCOUNTER — OFFICE VISIT (OUTPATIENT)
Dept: DERMATOLOGY | Facility: IMAGING CENTER | Age: 41
End: 2021-06-11
Payer: COMMERCIAL

## 2021-06-11 DIAGNOSIS — L81.4 LENTIGO: ICD-10-CM

## 2021-06-11 PROCEDURE — 99202 OFFICE O/P NEW SF 15 MIN: CPT | Performed by: NURSE PRACTITIONER

## 2021-06-11 NOTE — PROGRESS NOTES
DERMATOLOGY NOTE  NEW VISIT       Chief complaint: Skin Lesion and Establish Care     HPI:  Brown skin lesion   Location: right cheek   Time present:  10 years   Painful lesion: No  Itching lesion: No  Enlarging lesion: Yes  Anything make it better or worse?no          History of skin cancer: No  History of precancers/actinic keratoses: No  History of biopsies:No  History of blistering/severe sunburns:No  Family history of skin cancer:No  Family history of atypical moles:No      Allergies   Allergen Reactions   • Gluten Meal Hives, Itching, Rash and Swelling   • Lactose    • Whey         MEDICATIONS:  Medications relevant to specialty reviewed.     REVIEW OF SYSTEMS:   Positive for skin (see HPI)  Negative for fevers and chills       EXAM:  There were no vitals taken for this visit.  Constitutional: Well-developed, well-nourished, and in no distress.     A focused skin exam was performed including the affected areas of the head (including face). Notable findings on exam today listed below and/or in assessment/plan.     1.9x1.8cm light brown macule to right cheek. No concerning features under dermoscopy    IMPRESSION / PLAN:    1. Lentigo  - Benign-appearing nature of lesion discussed.   Patient declines bx today  Measurements and phots taken  Re-check 6 weeks  rtc sooner for any changes.   Patient verbalized understanding and is agreeable to plan       Please note that this dictation was created using voice recognition software. I have made every reasonable attempt to correct obvious errors, but I expect that there are errors of grammar and possibly content that I did not discover before finalizing the note.      Return to clinic in: No follow-ups on file. and as needed for any new or changing skin lesions.

## 2021-07-23 ENCOUNTER — APPOINTMENT (OUTPATIENT)
Dept: DERMATOLOGY | Facility: IMAGING CENTER | Age: 41
End: 2021-07-23
Payer: COMMERCIAL

## 2021-08-17 ENCOUNTER — HOSPITAL ENCOUNTER (EMERGENCY)
Facility: MEDICAL CENTER | Age: 41
End: 2021-08-17
Payer: COMMERCIAL

## 2021-08-17 VITALS
RESPIRATION RATE: 14 BRPM | HEIGHT: 62 IN | WEIGHT: 137.79 LBS | OXYGEN SATURATION: 99 % | HEART RATE: 89 BPM | TEMPERATURE: 98.3 F | DIASTOLIC BLOOD PRESSURE: 72 MMHG | BODY MASS INDEX: 25.36 KG/M2 | SYSTOLIC BLOOD PRESSURE: 112 MMHG

## 2021-08-17 PROCEDURE — 302449 STATCHG TRIAGE ONLY (STATISTIC)

## 2021-08-17 ASSESSMENT — FIBROSIS 4 INDEX: FIB4 SCORE: 0.47

## 2021-08-17 NOTE — ED TRIAGE NOTES
"Chief Complaint   Patient presents with   • Leg Pain     LLE, developed feeling of cramping starting from toes and radiating up leg x 2 -3 months, states pain became more intense today     /72   Pulse 89   Temp 36.8 °C (98.3 °F) (Temporal)   Resp 14   Ht 1.575 m (5' 2\")   Wt 62.5 kg (137 lb 12.6 oz)   LMP 08/14/2021   SpO2 99%   BMI 25.20 kg/m²     Pt ambulated to ED w/ friend for c/o cramping LLE.  Pt reports Hx of anemia w/ similar symptoms.      Pt states has received Covid vaccines x 2.    "

## 2021-08-18 ENCOUNTER — OFFICE VISIT (OUTPATIENT)
Dept: DERMATOLOGY | Facility: IMAGING CENTER | Age: 41
End: 2021-08-18
Payer: COMMERCIAL

## 2021-08-18 DIAGNOSIS — L81.4 LENTIGO: ICD-10-CM

## 2021-08-18 PROCEDURE — 99212 OFFICE O/P EST SF 10 MIN: CPT | Performed by: NURSE PRACTITIONER

## 2021-08-18 NOTE — PROGRESS NOTES
DERMATOLOGY NOTE  NEW VISIT       Chief complaint: Follow-Up and Actinic Keratosis       Here today to f/ on spot on right cheek  Has not noticed any changes from last visit      Initial history:  HPI:  Brown skin lesion   Location: right cheek   Time present:  10 years   Painful lesion: No  Itching lesion: No  Enlarging lesion: Yes  Anything make it better or worse?no          History of skin cancer: No  History of precancers/actinic keratoses: No  History of biopsies:No  History of blistering/severe sunburns:No  Family history of skin cancer:No  Family history of atypical moles:No      Allergies   Allergen Reactions   • Gluten Meal Hives, Itching, Rash and Swelling   • Lactose    • Whey         MEDICATIONS:  Medications relevant to specialty reviewed.     REVIEW OF SYSTEMS:   Positive for skin (see HPI)  Negative for fevers and chills       EXAM:  LMP 08/14/2021   Constitutional: Well-developed, well-nourished, and in no distress.     A focused skin exam was performed including the affected areas of the head (including face). Notable findings on exam today listed below and/or in assessment/plan.     1.9x1.8cm light brown macule to right cheek. No concerning features under dermoscopy    IMPRESSION / PLAN:    1. Lentigo  - Benign-appearing nature of lesiondiscussed. Advised to return to clinic for any new or concerning changes.  No changes from last visit  Patient verbalized understanding and is agreeable to plan       Please note that this dictation was created using voice recognition software. I have made every reasonable attempt to correct obvious errors, but I expect that there are errors of grammar and possibly content that I did not discover before finalizing the note.    I have performed a physical exam and reviewed and updated ROS and Plan today (8/18/2021). In review of dermatology visit (6/11/2021), there are no changes except as documented above.       Return to clinic in: No follow-ups on file. and as  needed for any new or changing skin lesions.

## 2021-08-19 ENCOUNTER — TELEMEDICINE (OUTPATIENT)
Dept: MEDICAL GROUP | Facility: MEDICAL CENTER | Age: 41
End: 2021-08-19
Payer: COMMERCIAL

## 2021-08-19 ENCOUNTER — HOSPITAL ENCOUNTER (OUTPATIENT)
Dept: LAB | Facility: MEDICAL CENTER | Age: 41
End: 2021-08-19
Attending: NURSE PRACTITIONER
Payer: COMMERCIAL

## 2021-08-19 VITALS
HEART RATE: 87 BPM | HEIGHT: 62 IN | SYSTOLIC BLOOD PRESSURE: 128 MMHG | DIASTOLIC BLOOD PRESSURE: 88 MMHG | BODY MASS INDEX: 24.48 KG/M2 | OXYGEN SATURATION: 98 % | WEIGHT: 133 LBS | TEMPERATURE: 98.2 F

## 2021-08-19 DIAGNOSIS — Z00.00 ANNUAL PHYSICAL EXAM: ICD-10-CM

## 2021-08-19 DIAGNOSIS — R25.2 LEG CRAMPS: ICD-10-CM

## 2021-08-19 DIAGNOSIS — Z11.3 SCREENING EXAMINATION FOR STD (SEXUALLY TRANSMITTED DISEASE): ICD-10-CM

## 2021-08-19 LAB
ALBUMIN SERPL BCP-MCNC: 4.3 G/DL (ref 3.2–4.9)
ALBUMIN/GLOB SERPL: 1.3 G/DL
ALP SERPL-CCNC: 54 U/L (ref 30–99)
ALT SERPL-CCNC: 10 U/L (ref 2–50)
ANION GAP SERPL CALC-SCNC: 14 MMOL/L (ref 7–16)
ANISOCYTOSIS BLD QL SMEAR: ABNORMAL
AST SERPL-CCNC: 15 U/L (ref 12–45)
BASOPHILS # BLD AUTO: 1.1 % (ref 0–1.8)
BASOPHILS # BLD: 0.08 K/UL (ref 0–0.12)
BILIRUB SERPL-MCNC: <0.2 MG/DL (ref 0.1–1.5)
BUN SERPL-MCNC: 18 MG/DL (ref 8–22)
CALCIUM SERPL-MCNC: 9.2 MG/DL (ref 8.5–10.5)
CHLORIDE SERPL-SCNC: 103 MMOL/L (ref 96–112)
CO2 SERPL-SCNC: 22 MMOL/L (ref 20–33)
COMMENT 1642: NORMAL
CREAT SERPL-MCNC: 0.89 MG/DL (ref 0.5–1.4)
EOSINOPHIL # BLD AUTO: 0.11 K/UL (ref 0–0.51)
EOSINOPHIL NFR BLD: 1.5 % (ref 0–6.9)
ERYTHROCYTE [DISTWIDTH] IN BLOOD BY AUTOMATED COUNT: 47.6 FL (ref 35.9–50)
FERRITIN SERPL-MCNC: 5.2 NG/ML (ref 10–291)
GLOBULIN SER CALC-MCNC: 3.3 G/DL (ref 1.9–3.5)
GLUCOSE SERPL-MCNC: 95 MG/DL (ref 65–99)
HCT VFR BLD AUTO: 30.7 % (ref 37–47)
HGB BLD-MCNC: 8.5 G/DL (ref 12–16)
HIV 1+2 AB+HIV1 P24 AG SERPL QL IA: NORMAL
HYPOCHROMIA BLD QL SMEAR: ABNORMAL
IMM GRANULOCYTES # BLD AUTO: 0.04 K/UL (ref 0–0.11)
IMM GRANULOCYTES NFR BLD AUTO: 0.5 % (ref 0–0.9)
IRON SATN MFR SERPL: 3 % (ref 15–55)
IRON SERPL-MCNC: 16 UG/DL (ref 40–170)
LYMPHOCYTES # BLD AUTO: 1.56 K/UL (ref 1–4.8)
LYMPHOCYTES NFR BLD: 20.9 % (ref 22–41)
MCH RBC QN AUTO: 20.7 PG (ref 27–33)
MCHC RBC AUTO-ENTMCNC: 27.7 G/DL (ref 33.6–35)
MCV RBC AUTO: 74.9 FL (ref 81.4–97.8)
MICROCYTES BLD QL SMEAR: ABNORMAL
MONOCYTES # BLD AUTO: 0.33 K/UL (ref 0–0.85)
MONOCYTES NFR BLD AUTO: 4.4 % (ref 0–13.4)
MORPHOLOGY BLD-IMP: NORMAL
NEUTROPHILS # BLD AUTO: 5.33 K/UL (ref 2–7.15)
NEUTROPHILS NFR BLD: 71.6 % (ref 44–72)
NRBC # BLD AUTO: 0 K/UL
NRBC BLD-RTO: 0 /100 WBC
OVALOCYTES BLD QL SMEAR: NORMAL
PLATELET # BLD AUTO: 622 K/UL (ref 164–446)
PLATELET BLD QL SMEAR: NORMAL
PMV BLD AUTO: 9.7 FL (ref 9–12.9)
POIKILOCYTOSIS BLD QL SMEAR: NORMAL
POTASSIUM SERPL-SCNC: 4 MMOL/L (ref 3.6–5.5)
PROT SERPL-MCNC: 7.6 G/DL (ref 6–8.2)
RBC # BLD AUTO: 4.1 M/UL (ref 4.2–5.4)
RBC BLD AUTO: PRESENT
SODIUM SERPL-SCNC: 139 MMOL/L (ref 135–145)
TIBC SERPL-MCNC: 495 UG/DL (ref 250–450)
TREPONEMA PALLIDUM IGG+IGM AB [PRESENCE] IN SERUM OR PLASMA BY IMMUNOASSAY: NORMAL
UIBC SERPL-MCNC: 479 UG/DL (ref 110–370)
WBC # BLD AUTO: 7.5 K/UL (ref 4.8–10.8)

## 2021-08-19 PROCEDURE — 86780 TREPONEMA PALLIDUM: CPT

## 2021-08-19 PROCEDURE — 99213 OFFICE O/P EST LOW 20 MIN: CPT | Mod: 95,CR | Performed by: NURSE PRACTITIONER

## 2021-08-19 PROCEDURE — 87491 CHLMYD TRACH DNA AMP PROBE: CPT

## 2021-08-19 PROCEDURE — 85025 COMPLETE CBC W/AUTO DIFF WBC: CPT

## 2021-08-19 PROCEDURE — 87389 HIV-1 AG W/HIV-1&-2 AB AG IA: CPT

## 2021-08-19 PROCEDURE — 36415 COLL VENOUS BLD VENIPUNCTURE: CPT

## 2021-08-19 PROCEDURE — 83550 IRON BINDING TEST: CPT

## 2021-08-19 PROCEDURE — 87591 N.GONORRHOEAE DNA AMP PROB: CPT

## 2021-08-19 PROCEDURE — 83540 ASSAY OF IRON: CPT

## 2021-08-19 PROCEDURE — 82728 ASSAY OF FERRITIN: CPT

## 2021-08-19 PROCEDURE — 80053 COMPREHEN METABOLIC PANEL: CPT

## 2021-08-19 RX ORDER — ETONOGESTREL AND ETHINYL ESTRADIOL VAGINAL RING .015; .12 MG/D; MG/D
RING VAGINAL
COMMUNITY
Start: 2021-07-13 | End: 2021-09-10

## 2021-08-19 ASSESSMENT — FIBROSIS 4 INDEX: FIB4 SCORE: 0.47

## 2021-08-19 NOTE — ASSESSMENT & PLAN NOTE
Ongoing for a few months, worse around her period. Does have a history of anemia, had been chewing more ice so she restarted her iron supplement. She would like to get her labs checked.    She has tried drinking more water, standing/walking (makes it worse).     Has not tried electrolyte supplements, pickle juice, or magnesium supplements.    Happening while she is sleeping.

## 2021-08-20 LAB
C TRACH DNA SPEC QL NAA+PROBE: NEGATIVE
N GONORRHOEA DNA SPEC QL NAA+PROBE: NEGATIVE
SPECIMEN SOURCE: NORMAL

## 2021-08-31 ENCOUNTER — APPOINTMENT (OUTPATIENT)
Dept: RADIOLOGY | Facility: MEDICAL CENTER | Age: 41
End: 2021-08-31
Attending: EMERGENCY MEDICINE
Payer: COMMERCIAL

## 2021-08-31 ENCOUNTER — HOSPITAL ENCOUNTER (OUTPATIENT)
Facility: MEDICAL CENTER | Age: 41
End: 2021-08-31
Attending: PHYSICIAN ASSISTANT
Payer: COMMERCIAL

## 2021-08-31 ENCOUNTER — HOSPITAL ENCOUNTER (EMERGENCY)
Facility: MEDICAL CENTER | Age: 41
End: 2021-08-31
Attending: EMERGENCY MEDICINE
Payer: COMMERCIAL

## 2021-08-31 ENCOUNTER — OFFICE VISIT (OUTPATIENT)
Dept: URGENT CARE | Facility: PHYSICIAN GROUP | Age: 41
End: 2021-08-31
Payer: COMMERCIAL

## 2021-08-31 VITALS
RESPIRATION RATE: 18 BRPM | SYSTOLIC BLOOD PRESSURE: 120 MMHG | HEART RATE: 78 BPM | WEIGHT: 135.14 LBS | TEMPERATURE: 98.3 F | OXYGEN SATURATION: 100 % | BODY MASS INDEX: 24.87 KG/M2 | HEIGHT: 62 IN | DIASTOLIC BLOOD PRESSURE: 62 MMHG

## 2021-08-31 VITALS
DIASTOLIC BLOOD PRESSURE: 64 MMHG | BODY MASS INDEX: 24.48 KG/M2 | RESPIRATION RATE: 15 BRPM | HEIGHT: 62 IN | HEART RATE: 107 BPM | TEMPERATURE: 99.8 F | WEIGHT: 133 LBS | OXYGEN SATURATION: 100 % | SYSTOLIC BLOOD PRESSURE: 114 MMHG

## 2021-08-31 DIAGNOSIS — N92.1 MENORRHAGIA WITH IRREGULAR CYCLE: ICD-10-CM

## 2021-08-31 DIAGNOSIS — D21.9 FIBROIDS: ICD-10-CM

## 2021-08-31 DIAGNOSIS — R30.0 DYSURIA: ICD-10-CM

## 2021-08-31 DIAGNOSIS — D50.0 BLOOD LOSS ANEMIA: ICD-10-CM

## 2021-08-31 DIAGNOSIS — D50.9 IRON DEFICIENCY ANEMIA, UNSPECIFIED IRON DEFICIENCY ANEMIA TYPE: ICD-10-CM

## 2021-08-31 DIAGNOSIS — N80.9 ENDOMETRIOSIS: ICD-10-CM

## 2021-08-31 DIAGNOSIS — N93.9 VAGINAL BLEEDING: ICD-10-CM

## 2021-08-31 LAB
ALBUMIN SERPL BCP-MCNC: 3.8 G/DL (ref 3.2–4.9)
ALBUMIN/GLOB SERPL: 1.2 G/DL
ALP SERPL-CCNC: 58 U/L (ref 30–99)
ALT SERPL-CCNC: 12 U/L (ref 2–50)
ANION GAP SERPL CALC-SCNC: 12 MMOL/L (ref 7–16)
APPEARANCE UR: ABNORMAL
APPEARANCE UR: NORMAL
AST SERPL-CCNC: 45 U/L (ref 12–45)
BACTERIA #/AREA URNS HPF: ABNORMAL /HPF
BASOPHILS # BLD AUTO: 0.8 % (ref 0–1.8)
BASOPHILS # BLD: 0.07 K/UL (ref 0–0.12)
BILIRUB SERPL-MCNC: 0.2 MG/DL (ref 0.1–1.5)
BILIRUB UR QL STRIP.AUTO: NEGATIVE
BILIRUB UR STRIP-MCNC: NORMAL MG/DL
BUN SERPL-MCNC: 11 MG/DL (ref 8–22)
CALCIUM SERPL-MCNC: 9 MG/DL (ref 8.4–10.2)
CHLORIDE SERPL-SCNC: 104 MMOL/L (ref 96–112)
CO2 SERPL-SCNC: 22 MMOL/L (ref 20–33)
COLOR UR AUTO: NORMAL
COLOR UR: YELLOW
COMMENT 1642: NORMAL
CREAT SERPL-MCNC: 0.86 MG/DL (ref 0.5–1.4)
EOSINOPHIL # BLD AUTO: 0.28 K/UL (ref 0–0.51)
EOSINOPHIL NFR BLD: 3.2 % (ref 0–6.9)
EPI CELLS #/AREA URNS HPF: ABNORMAL /HPF
ERYTHROCYTE [DISTWIDTH] IN BLOOD BY AUTOMATED COUNT: 45.4 FL (ref 35.9–50)
GLOBULIN SER CALC-MCNC: 3.1 G/DL (ref 1.9–3.5)
GLUCOSE SERPL-MCNC: 98 MG/DL (ref 65–99)
GLUCOSE UR STRIP.AUTO-MCNC: NEGATIVE MG/DL
GLUCOSE UR STRIP.AUTO-MCNC: NEGATIVE MG/DL
HCG SERPL QL: NEGATIVE
HCG UR QL: NEGATIVE
HCT VFR BLD AUTO: 25.7 % (ref 37–47)
HGB BLD-MCNC: 7.2 G/DL (ref 12–16)
IMM GRANULOCYTES # BLD AUTO: 0.04 K/UL (ref 0–0.11)
IMM GRANULOCYTES NFR BLD AUTO: 0.5 % (ref 0–0.9)
INT CON NEG: NEGATIVE
INT CON POS: POSITIVE
KETONES UR STRIP.AUTO-MCNC: 15 MG/DL
KETONES UR STRIP.AUTO-MCNC: NEGATIVE MG/DL
LEUKOCYTE ESTERASE UR QL STRIP.AUTO: NEGATIVE
LEUKOCYTE ESTERASE UR QL STRIP.AUTO: NORMAL
LIPASE SERPL-CCNC: 35 U/L (ref 7–58)
LYMPHOCYTES # BLD AUTO: 2.37 K/UL (ref 1–4.8)
LYMPHOCYTES NFR BLD: 26.9 % (ref 22–41)
MCH RBC QN AUTO: 20.2 PG (ref 27–33)
MCHC RBC AUTO-ENTMCNC: 28 G/DL (ref 33.6–35)
MCV RBC AUTO: 72.2 FL (ref 81.4–97.8)
MICRO URNS: ABNORMAL
MONOCYTES # BLD AUTO: 0.44 K/UL (ref 0–0.85)
MONOCYTES NFR BLD AUTO: 5 % (ref 0–13.4)
MUCOUS THREADS #/AREA URNS HPF: ABNORMAL /HPF
NEUTROPHILS # BLD AUTO: 5.6 K/UL (ref 2–7.15)
NEUTROPHILS NFR BLD: 63.6 % (ref 44–72)
NITRITE UR QL STRIP.AUTO: NEGATIVE
NITRITE UR QL STRIP.AUTO: POSITIVE
NRBC # BLD AUTO: 0 K/UL
NRBC BLD-RTO: 0 /100 WBC
PH UR STRIP.AUTO: 5 [PH] (ref 5–8)
PH UR STRIP.AUTO: 5.5 [PH] (ref 5–8)
PLATELET # BLD AUTO: 419 K/UL (ref 164–446)
PMV BLD AUTO: 9.1 FL (ref 9–12.9)
POC URINE PREGNANCY TEST: NEGATIVE
POTASSIUM SERPL-SCNC: 4.8 MMOL/L (ref 3.6–5.5)
PROT SERPL-MCNC: 6.9 G/DL (ref 6–8.2)
PROT UR QL STRIP: 30 MG/DL
PROT UR QL STRIP: 300 MG/DL
RBC # BLD AUTO: 3.56 M/UL (ref 4.2–5.4)
RBC # URNS HPF: >150 /HPF
RBC UR QL AUTO: ABNORMAL
RBC UR QL AUTO: NORMAL
SODIUM SERPL-SCNC: 138 MMOL/L (ref 135–145)
SP GR UR STRIP.AUTO: 1.01
SP GR UR STRIP.AUTO: 1.02
UNIDENT CRYS URNS QL MICRO: ABNORMAL /HPF
UROBILINOGEN UR STRIP-MCNC: 2 MG/DL
WBC # BLD AUTO: 8.8 K/UL (ref 4.8–10.8)
WBC #/AREA URNS HPF: ABNORMAL /HPF

## 2021-08-31 PROCEDURE — 700105 HCHG RX REV CODE 258: Performed by: EMERGENCY MEDICINE

## 2021-08-31 PROCEDURE — 99285 EMERGENCY DEPT VISIT HI MDM: CPT

## 2021-08-31 PROCEDURE — 700111 HCHG RX REV CODE 636 W/ 250 OVERRIDE (IP): Performed by: EMERGENCY MEDICINE

## 2021-08-31 PROCEDURE — 87086 URINE CULTURE/COLONY COUNT: CPT

## 2021-08-31 PROCEDURE — 84703 CHORIONIC GONADOTROPIN ASSAY: CPT

## 2021-08-31 PROCEDURE — 87077 CULTURE AEROBIC IDENTIFY: CPT

## 2021-08-31 PROCEDURE — 81002 URINALYSIS NONAUTO W/O SCOPE: CPT | Performed by: PHYSICIAN ASSISTANT

## 2021-08-31 PROCEDURE — 81025 URINE PREGNANCY TEST: CPT

## 2021-08-31 PROCEDURE — 96374 THER/PROPH/DIAG INJ IV PUSH: CPT

## 2021-08-31 PROCEDURE — 85025 COMPLETE CBC W/AUTO DIFF WBC: CPT

## 2021-08-31 PROCEDURE — 83690 ASSAY OF LIPASE: CPT

## 2021-08-31 PROCEDURE — 700102 HCHG RX REV CODE 250 W/ 637 OVERRIDE(OP): Performed by: EMERGENCY MEDICINE

## 2021-08-31 PROCEDURE — A9270 NON-COVERED ITEM OR SERVICE: HCPCS | Performed by: EMERGENCY MEDICINE

## 2021-08-31 PROCEDURE — 81025 URINE PREGNANCY TEST: CPT | Performed by: PHYSICIAN ASSISTANT

## 2021-08-31 PROCEDURE — 99214 OFFICE O/P EST MOD 30 MIN: CPT | Performed by: PHYSICIAN ASSISTANT

## 2021-08-31 PROCEDURE — 81001 URINALYSIS AUTO W/SCOPE: CPT

## 2021-08-31 PROCEDURE — 80053 COMPREHEN METABOLIC PANEL: CPT

## 2021-08-31 PROCEDURE — 76856 US EXAM PELVIC COMPLETE: CPT

## 2021-08-31 PROCEDURE — 36415 COLL VENOUS BLD VENIPUNCTURE: CPT

## 2021-08-31 RX ORDER — LANOLIN ALCOHOL/MO/W.PET/CERES
325 CREAM (GRAM) TOPICAL
Qty: 90 TABLET | Refills: 0 | Status: SHIPPED | OUTPATIENT
Start: 2021-08-31 | End: 2021-10-13

## 2021-08-31 RX ORDER — SODIUM CHLORIDE 9 MG/ML
1000 INJECTION, SOLUTION INTRAVENOUS ONCE
Status: COMPLETED | OUTPATIENT
Start: 2021-08-31 | End: 2021-08-31

## 2021-08-31 RX ORDER — FERROUS SULFATE 325(65) MG
325 TABLET ORAL
Status: DISCONTINUED | OUTPATIENT
Start: 2021-08-31 | End: 2021-09-01 | Stop reason: HOSPADM

## 2021-08-31 RX ORDER — CEFDINIR 300 MG/1
300 CAPSULE ORAL EVERY 12 HOURS
Qty: 10 CAPSULE | Refills: 0 | Status: SHIPPED | OUTPATIENT
Start: 2021-08-31 | End: 2021-09-05

## 2021-08-31 RX ORDER — FERROUS SULFATE 325(65) MG
325 TABLET ORAL
Status: DISCONTINUED | OUTPATIENT
Start: 2021-09-01 | End: 2021-08-31

## 2021-08-31 RX ORDER — KETOROLAC TROMETHAMINE 30 MG/ML
30 INJECTION, SOLUTION INTRAMUSCULAR; INTRAVENOUS ONCE
Status: COMPLETED | OUTPATIENT
Start: 2021-08-31 | End: 2021-08-31

## 2021-08-31 RX ADMIN — SODIUM CHLORIDE 1000 ML: 9 INJECTION, SOLUTION INTRAVENOUS at 21:21

## 2021-08-31 RX ADMIN — FERROUS SULFATE TAB 325 MG (65 MG ELEMENTAL FE) 325 MG: 325 (65 FE) TAB at 22:17

## 2021-08-31 RX ADMIN — KETOROLAC TROMETHAMINE 30 MG: 30 INJECTION, SOLUTION INTRAMUSCULAR; INTRAVENOUS at 21:22

## 2021-08-31 ASSESSMENT — FIBROSIS 4 INDEX
FIB4 SCORE: 0.31
FIB4 SCORE: 0.31

## 2021-08-31 NOTE — PROGRESS NOTES
Subjective:   Mariela Kerr is a 41 y.o. female who presents for Other (H and H at 8. heavy bleeding, foul odor, wants to check for infection, SOB)      HPI:  This is a very pleasant 41-year-old female with a past medical history significant for uterine fibroids and endometriosis presenting to the clinic with menorrhagia x3 weeks.  Patient states over the last week she has been bleeding very heavily.  States she is going through a pad approximately every 30 minutes.  States she has felt very fatigued.  She is short of breath with minimal exertion.  Last lab work was 8/19/2021.  We reviewed the findings in clinic.  She was anemic with a hemoglobin of 8.5.  Hematocrit 30.7 Ferritin 5.2.  She was recently started on iron and has been taking her supplementation as prescribed by her PCP.  Patient has also been experiencing dysuria and mild lower abdominal pain.  Concern for possible infection.  Denies any fevers.  She does feel chilled and fatigued.    Review of Systems   Constitutional: Positive for malaise/fatigue. Negative for chills and fever.   Respiratory: Positive for shortness of breath.    Cardiovascular: Positive for palpitations.   Gastrointestinal: Positive for abdominal pain. Negative for diarrhea, nausea and vomiting.   Genitourinary: Positive for dysuria.        Menorrhagia   Skin: Negative for rash.   Neurological: Positive for dizziness. Negative for tingling, weakness and headaches.       Medications:    • cefdinir Caps  • ethinyl estradiol-etonogestrel  • IRON PO  • Loratadine Caps  • therapeutic multivitamin-minerals Tabs    Allergies: Gluten meal, Lactose, and Whey    Problem List: Mariela Kerr does not have any pertinent problems on file.    Surgical History:  Past Surgical History:   Procedure Laterality Date   • MYOMECTOMY         Past Social Hx: Mariela Kerr  reports that she has never smoked. She has never used smokeless tobacco. She reports  "previous alcohol use. She reports previous drug use.     Past Family Hx:  Mariela Kerr family history includes Cancer in her maternal aunt and maternal grandmother; Diabetes in her mother; Heart Disease in her father and mother; Hyperlipidemia in her father; Hypertension in her father and mother; Kidney Disease in her father.     Problem list, medications, and allergies reviewed by myself today in Epic.     Objective:     /64   Pulse (!) 107   Temp 37.7 °C (99.8 °F)   Resp 15   Ht 1.575 m (5' 2\")   Wt 60.3 kg (133 lb)   LMP 08/14/2021   SpO2 100%   BMI 24.33 kg/m²     Physical Exam  Constitutional:       Appearance: She is not toxic-appearing or diaphoretic.      Comments: Appears tired and fatigued   HENT:      Head: Normocephalic and atraumatic.      Mouth/Throat:      Mouth: Mucous membranes are moist.      Pharynx: No oropharyngeal exudate or posterior oropharyngeal erythema.   Eyes:      Extraocular Movements: Extraocular movements intact.      Conjunctiva/sclera: Conjunctivae normal.      Pupils: Pupils are equal, round, and reactive to light.   Cardiovascular:      Rate and Rhythm: Regular rhythm. Tachycardia present.      Pulses: Normal pulses.      Heart sounds: Normal heart sounds.   Pulmonary:      Effort: Pulmonary effort is normal.      Breath sounds: Normal breath sounds. No wheezing, rhonchi or rales.   Abdominal:      General: Bowel sounds are normal.      Tenderness: There is no abdominal tenderness. There is no guarding or rebound.   Musculoskeletal:      Cervical back: Normal range of motion.   Neurological:      General: No focal deficit present.      Mental Status: She is alert and oriented to person, place, and time. Mental status is at baseline.       Lab Results/POC Test Results   Results for orders placed or performed in visit on 08/31/21   POCT Urinalysis   Result Value Ref Range    POC Color Red Negative    POC Appearance Cloudy Negative    POC Leukocyte " Esterase Large Negative    POC Nitrites Positive Negative    POC Urobiligen 2.0 Negative (0.2) mg/dL    POC Protein 300 Negative mg/dL    POC Urine PH 5.0 5.0 - 8.0    POC Blood Large Negative    POC Specific Gravity 1.020 <1.005 - >1.030    POC Ketones 15 Negative mg/dL    POC Bilirubin Large Negative mg/dL    POC Glucose Negative Negative mg/dL   POCT PREGNANCY   Result Value Ref Range    POC Urine Pregnancy Test Negative Negative    Internal Control Positive Positive     Internal Control Negative Negative              Assessment/Plan:     Comments/MDM:     • Patient has been experiencing dysmenorrhea and menorrhagia x3 weeks.  Bleeding is progressively worsened over the last few days.  She is changing a pad every 30 minutes.  She is experiencing some systemic symptoms concerning for anemia.  She is experiencing worsening shortness of breath with minimal exertion.  She is very fatigued.  Slightly tachycardic and has bouts of lightheadedness.  Last lab work preformed showed the patient was anemic this was performed 2 weeks ago.  Does have a history of uterine fibroids.  To the patient's history and findings as well as physical exam findings I believe she would benefit from evaluation at a higher level of care.  Does have a prescription for oral birth control that we discussed starting.  Patient agreed and is going to follow-up in the ED for further guidance at this time.     Diagnosis and associated orders:     1. Menorrhagia with irregular cycle  POCT Urinalysis    POCT PREGNANCY   2. Fibroids  POCT Urinalysis    POCT PREGNANCY   3. Endometriosis  POCT Urinalysis    POCT PREGNANCY   4. Iron deficiency anemia, unspecified iron deficiency anemia type  POCT Urinalysis    POCT PREGNANCY   5. Dysuria  POCT Urinalysis    Urine Culture    POCT PREGNANCY    cefdinir (OMNICEF) 300 MG Cap            Differential diagnosis, natural history, supportive care, and indications for immediate follow-up discussed.    Advised the  patient to follow-up with the primary care physician for recheck, reevaluation, and consideration of further management.    Please note that this dictation was created using voice recognition software. I have made reasonable attempt to correct obvious errors, but I expect that there are errors of grammar and possibly content that I did not discover before finalizing the note.    This note was electronically signed by JAY Rutledge PA-C

## 2021-09-01 ENCOUNTER — TELEPHONE (OUTPATIENT)
Dept: MEDICAL GROUP | Facility: MEDICAL CENTER | Age: 41
End: 2021-09-01

## 2021-09-01 DIAGNOSIS — N92.1 MENORRHAGIA WITH IRREGULAR CYCLE: ICD-10-CM

## 2021-09-01 DIAGNOSIS — D50.0 IRON DEFICIENCY ANEMIA DUE TO CHRONIC BLOOD LOSS: ICD-10-CM

## 2021-09-01 ASSESSMENT — ENCOUNTER SYMPTOMS
WEAKNESS: 0
HEADACHES: 0
SHORTNESS OF BREATH: 1
FEVER: 0
CHILLS: 0
DIARRHEA: 0
DIZZINESS: 1
VOMITING: 0
PALPITATIONS: 1
NAUSEA: 0
TINGLING: 0
ABDOMINAL PAIN: 1

## 2021-09-01 NOTE — ED PROVIDER NOTES
ED Provider Note    ED Provider    Means of arrival: Private vehicle  History obtained from: Patient  History limited by: None    CHIEF COMPLAINT  Chief Complaint   Patient presents with   • Vaginal Bleeding   • Dizziness   • Fatigue       HPI  Mariela Kerr is a 41 y.o. female who presents with complaints of heavy vaginal bleeding.  She has been bleeding for approximately 3 weeks, it was mild until the last week.  The bleeding has been heavy over the last couple of days.  She is complaining of dizziness lightheadedness and shortness of breath with exertion.  She does have a history of heavy vaginal bleeding periodically.  She has had fibroids, and she was on a nova ring and that was removed.  She has a prescription for birth control pills but has been filled yet.    No recent fevers chills or sweats, no nausea vomiting or diarrhea.    She is complaining of odor from vaginal discharge.  She was recently evaluated for STDs on the 19th and those were negative.    REVIEW OF SYSTEMS  See HPI for further details. All other systems are negative.     PAST MEDICAL HISTORY   has a past medical history of Endometriosis.    SOCIAL HISTORY  Social History     Tobacco Use   • Smoking status: Never Smoker   • Smokeless tobacco: Never Used   Vaping Use   • Vaping Use: Never used   Substance and Sexual Activity   • Alcohol use: Not Currently   • Drug use: Not Currently   • Sexual activity: Not on file       SURGICAL HISTORY   has a past surgical history that includes myomectomy.    CURRENT MEDICATIONS  Home Medications     Reviewed by Kayli Mccray R.N. (Registered Nurse) on 08/31/21 at 1715  Med List Status: Partial   Medication Last Dose Status   cefdinir (OMNICEF) 300 MG Cap  Active   ethinyl estradiol-etonogestrel (NUVARING) 0.12-0.015 MG/24HR vaginal ring  Active   Ferrous Sulfate (IRON PO)  Active   Loratadine (CLARITIN) 10 MG Cap  Active   therapeutic multivitamin-minerals (THERAGRAN-M) Tab  Active           "      ALLERGIES  Allergies   Allergen Reactions   • Gluten Meal Hives, Itching, Rash and Swelling   • Lactose    • Whey        PHYSICAL EXAM  VITAL SIGNS: /65   Pulse 77   Temp 36.8 °C (98.3 °F) (Temporal)   Resp 18   Ht 1.575 m (5' 2\")   Wt 61.3 kg (135 lb 2.3 oz)   LMP 08/14/2021   SpO2 100%   BMI 24.72 kg/m²   Constitutional: Alert in no apparent distress.  HENT: No signs of trauma, Mucous membranes are moist   Eyes:  Conjunctiva normal, Non-icteric.   Neck: No obvious tenderness with movement  Lymphatic: No lymphadenopathy noted.   Cardiovascular: Mild tachycardia  Thorax & Lungs: No respiratory distress respirations are even and unlabored   abdomen: No distention.  Skin: Warm, Dry,Normal color    Extremities:No edema,No cyanosis,    Musculoskeletal: Moving all extremities spontaneously without signs of discomfort  Neurologic: Alert ,Oriented x4, Normal motor function, moving all extremities spontaneously   psychiatric: Affect normal, Judgment normal, Mood normal.       MEDICAL DECISION MAKING  This is a 41 y.o. female who presents with heavy vaginal bleeding.  She has having some constitutional symptoms are concerning for anemia.  She has a history of dysmenorrhea.  And she states that her last hemoglobin was 14.  Lab tests are being done to evaluate for anemia, and pregnancy.    DIAGNOSTIC STUDIES / PROCEDURES    EKG      LABS  Results for orders placed or performed during the hospital encounter of 08/31/21   COMP METABOLIC PANEL   Result Value Ref Range    Sodium 138 135 - 145 mmol/L    Potassium 4.8 3.6 - 5.5 mmol/L    Chloride 104 96 - 112 mmol/L    Co2 22 20 - 33 mmol/L    Anion Gap 12.0 7.0 - 16.0    Glucose 98 65 - 99 mg/dL    Bun 11 8 - 22 mg/dL    Creatinine 0.86 0.50 - 1.40 mg/dL    Calcium 9.0 8.4 - 10.2 mg/dL    AST(SGOT) 45 12 - 45 U/L    ALT(SGPT) 12 2 - 50 U/L    Alkaline Phosphatase 58 30 - 99 U/L    Total Bilirubin 0.2 0.1 - 1.5 mg/dL    Albumin 3.8 3.2 - 4.9 g/dL    Total Protein " 6.9 6.0 - 8.2 g/dL    Globulin 3.1 1.9 - 3.5 g/dL    A-G Ratio 1.2 g/dL   LIPASE   Result Value Ref Range    Lipase 35 7 - 58 U/L   URINALYSIS,CULTURE IF INDICATED    Specimen: Urine, Clean Catch   Result Value Ref Range    Color Yellow     Character Hazy (A)     Specific Gravity 1.015 <1.035    Ph 5.5 5.0 - 8.0    Glucose Negative Negative mg/dL    Ketones Negative Negative mg/dL    Protein 30 (A) Negative mg/dL    Bilirubin Negative Negative    Nitrite Negative Negative    Leukocyte Esterase Negative Negative    Occult Blood Large (A) Negative    Micro Urine Req Microscopic    HCG QUAL SERUM   Result Value Ref Range    Beta-Hcg Qualitative Serum Negative Negative   CBC WITH DIFFERENTIAL   Result Value Ref Range    WBC 8.8 4.8 - 10.8 K/uL    RBC 3.56 (L) 4.20 - 5.40 M/uL    Hemoglobin 7.2 (L) 12.0 - 16.0 g/dL    Hematocrit 25.7 (L) 37.0 - 47.0 %    MCV 72.2 (L) 81.4 - 97.8 fL    MCH 20.2 (L) 27.0 - 33.0 pg    MCHC 28.0 (L) 33.6 - 35.0 g/dL    RDW 45.4 35.9 - 50.0 fL    Platelet Count 419 164 - 446 K/uL    MPV 9.1 9.0 - 12.9 fL    Neutrophils-Polys 63.60 44.00 - 72.00 %    Lymphocytes 26.90 22.00 - 41.00 %    Monocytes 5.00 0.00 - 13.40 %    Eosinophils 3.20 0.00 - 6.90 %    Basophils 0.80 0.00 - 1.80 %    Immature Granulocytes 0.50 0.00 - 0.90 %    Nucleated RBC 0.00 /100 WBC    Neutrophils (Absolute) 5.60 2.00 - 7.15 K/uL    Lymphs (Absolute) 2.37 1.00 - 4.80 K/uL    Monos (Absolute) 0.44 0.00 - 0.85 K/uL    Eos (Absolute) 0.28 0.00 - 0.51 K/uL    Baso (Absolute) 0.07 0.00 - 0.12 K/uL    Immature Granulocytes (abs) 0.04 0.00 - 0.11 K/uL    NRBC (Absolute) 0.00 K/uL   ESTIMATED GFR   Result Value Ref Range    GFR If African American >60 >60 mL/min/1.73 m 2    GFR If Non African American >60 >60 mL/min/1.73 m 2   URINE MICROSCOPIC (W/UA)   Result Value Ref Range    WBC 2-5 /hpf    RBC >150 (A) /hpf    Bacteria Few (A) None /hpf    Epithelial Cells Few Few /hpf    Mucous Threads Few /hpf    Urine Crystals Few  Amorphous /hpf   HCG QUALITATIVE UR (Lab)   Result Value Ref Range    Beta-Hcg Urine Negative Negative   DIFFERENTIAL COMMENT   Result Value Ref Range    Comments-Diff see below          RADIOLOGY  US-PELVIC COMPLETE (TRANSABDOMINAL/TRANSVAGINAL) (COMBO)   Final Result      1.  Enlarged uterus with 2 large partially exophytic uterine body 5 cm on the left and 5.7 cm in diameter on the right.      2.  Endometrial complex not visualized due to shadowing from the myomata.      3.  Normal appearance of each ovary.      4.  No free fluid or adnexal mass identified.          COURSE  Pertinent Labs & Imaging studies reviewed. (See chart for details)    9:23 PM - Patient seen and examined at bedside. Discussed plan of care,     Patient pain is resolved with Toradol.  And her lightheadedness resolved with some fluids.  Her hemoglobin 7.2.  She is young healthy, and will be able to tolerate this she will be started on iron tablets to help with support hemoglobin.    She has new prescription for birth control and I instructed her to start this tonight.  Along with ibuprofen every 6 hours to help reduce bleeding.  She is to follow-up with the primary doctor in 2 days to repeat hemoglobin and return if her symptoms change or worsen.    Discharged home in stable condition    FINAL IMPRESSION  1. Vaginal bleeding    2. Blood loss anemia        The note accurately reflects work and decisions made by me.  Carlos Smith D.O.  8/31/2021  10:13 PM

## 2021-09-01 NOTE — TELEPHONE ENCOUNTER
Pt notified to complete labs on Friday, pt scheduled for fv 9/10 with PCP. Informed pt to go back to to ER if symptoms worsen.

## 2021-09-01 NOTE — ED TRIAGE NOTES
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

## 2021-09-01 NOTE — DISCHARGE INSTRUCTIONS
Please start your birth control pill tonight.  Please use ibuprofen 600 mg every 6 hours for the next 48 hours.    Please see your primary care doctor to repeat your hemoglobin in 2 days.  Return if your symptoms change or worsen    You are being placed on an iron tablet, this can cause constipation please use the Dukas 8 sodium over-the-counter twice a day to prevent this.

## 2021-09-01 NOTE — TELEPHONE ENCOUNTER
VOICEMAIL  1. Caller Name: Mariela Kerr  Call Back Number:[phone]    2. Message: Pt left message requesting repeat CBC and BMP labs. She states she was at the ER yesterday. She states she is still bleeding, but states the bleeding is a little bit less today. She is also experiencing some shortness of breath and tachycardia.    Pt states she contacted her OB and has switched to a pill birth control. Please advise.

## 2021-09-02 ENCOUNTER — PATIENT OUTREACH (OUTPATIENT)
Dept: MEDICAL GROUP | Facility: MEDICAL CENTER | Age: 41
End: 2021-09-02

## 2021-09-02 DIAGNOSIS — R30.0 DYSURIA: ICD-10-CM

## 2021-09-03 ENCOUNTER — HOSPITAL ENCOUNTER (OUTPATIENT)
Dept: LAB | Facility: MEDICAL CENTER | Age: 41
End: 2021-09-03
Attending: NURSE PRACTITIONER
Payer: COMMERCIAL

## 2021-09-03 DIAGNOSIS — N92.1 MENORRHAGIA WITH IRREGULAR CYCLE: ICD-10-CM

## 2021-09-03 DIAGNOSIS — D50.0 IRON DEFICIENCY ANEMIA DUE TO CHRONIC BLOOD LOSS: ICD-10-CM

## 2021-09-03 LAB
ANISOCYTOSIS BLD QL SMEAR: ABNORMAL
BASOPHILS # BLD AUTO: 0.9 % (ref 0–1.8)
BASOPHILS # BLD: 0.06 K/UL (ref 0–0.12)
COMMENT 1642: NORMAL
EOSINOPHIL # BLD AUTO: 0.12 K/UL (ref 0–0.51)
EOSINOPHIL NFR BLD: 1.9 % (ref 0–6.9)
ERYTHROCYTE [DISTWIDTH] IN BLOOD BY AUTOMATED COUNT: 49 FL (ref 35.9–50)
HCT VFR BLD AUTO: 28.9 % (ref 37–47)
HGB BLD-MCNC: 7.8 G/DL (ref 12–16)
IMM GRANULOCYTES # BLD AUTO: 0.03 K/UL (ref 0–0.11)
IMM GRANULOCYTES NFR BLD AUTO: 0.5 % (ref 0–0.9)
LYMPHOCYTES # BLD AUTO: 1.72 K/UL (ref 1–4.8)
LYMPHOCYTES NFR BLD: 26.7 % (ref 22–41)
MCH RBC QN AUTO: 20.2 PG (ref 27–33)
MCHC RBC AUTO-ENTMCNC: 27 G/DL (ref 33.6–35)
MCV RBC AUTO: 74.9 FL (ref 81.4–97.8)
MICROCYTES BLD QL SMEAR: ABNORMAL
MONOCYTES # BLD AUTO: 0.42 K/UL (ref 0–0.85)
MONOCYTES NFR BLD AUTO: 6.5 % (ref 0–13.4)
MORPHOLOGY BLD-IMP: NORMAL
NEUTROPHILS # BLD AUTO: 4.1 K/UL (ref 2–7.15)
NEUTROPHILS NFR BLD: 63.5 % (ref 44–72)
NRBC # BLD AUTO: 0 K/UL
NRBC BLD-RTO: 0 /100 WBC
OVALOCYTES BLD QL SMEAR: NORMAL
PLATELET # BLD AUTO: 504 K/UL (ref 164–446)
PLATELET BLD QL SMEAR: NORMAL
PMV BLD AUTO: 10 FL (ref 9–12.9)
POIKILOCYTOSIS BLD QL SMEAR: NORMAL
POLYCHROMASIA BLD QL SMEAR: NORMAL
RBC # BLD AUTO: 3.86 M/UL (ref 4.2–5.4)
RBC BLD AUTO: PRESENT
WBC # BLD AUTO: 6.5 K/UL (ref 4.8–10.8)

## 2021-09-03 PROCEDURE — 85025 COMPLETE CBC W/AUTO DIFF WBC: CPT

## 2021-09-03 PROCEDURE — 36415 COLL VENOUS BLD VENIPUNCTURE: CPT

## 2021-09-08 ENCOUNTER — TELEPHONE (OUTPATIENT)
Dept: MEDICAL GROUP | Facility: MEDICAL CENTER | Age: 41
End: 2021-09-08

## 2021-09-08 NOTE — TELEPHONE ENCOUNTER
VOICEMAIL  1. Caller Name: Mariela Kerr  Call Back Number: [phone]    2. Message: Pt left message stating she viewed her urine culture results on [employer]. Pt is wondering if she should be on different antibiotic, she states that she looked online and believes the cefdinir is not sensitive to the bacteria found in the culture. Please advise.

## 2021-09-08 NOTE — TELEPHONE ENCOUNTER
Please notify patient that the omnicef she was prescribed should have adequately treated this infection.     DAYTON Liu.

## 2021-09-10 ENCOUNTER — OFFICE VISIT (OUTPATIENT)
Dept: MEDICAL GROUP | Facility: MEDICAL CENTER | Age: 41
End: 2021-09-10
Payer: COMMERCIAL

## 2021-09-10 VITALS
BODY MASS INDEX: 25.21 KG/M2 | DIASTOLIC BLOOD PRESSURE: 76 MMHG | HEIGHT: 62 IN | HEART RATE: 97 BPM | WEIGHT: 137 LBS | SYSTOLIC BLOOD PRESSURE: 116 MMHG | OXYGEN SATURATION: 98 % | TEMPERATURE: 98.2 F

## 2021-09-10 DIAGNOSIS — Z87.42 H/O MENORRHAGIA: ICD-10-CM

## 2021-09-10 DIAGNOSIS — D50.0 IRON DEFICIENCY ANEMIA DUE TO CHRONIC BLOOD LOSS: ICD-10-CM

## 2021-09-10 DIAGNOSIS — D21.9 FIBROIDS: ICD-10-CM

## 2021-09-10 PROCEDURE — 99213 OFFICE O/P EST LOW 20 MIN: CPT | Performed by: NURSE PRACTITIONER

## 2021-09-10 RX ORDER — DROSPIRENONE AND ETHINYL ESTRADIOL 0.02-3(28)
KIT ORAL
COMMUNITY
Start: 2021-08-30 | End: 2023-12-08

## 2021-09-10 ASSESSMENT — FIBROSIS 4 INDEX: FIB4 SCORE: 1.06

## 2021-09-10 NOTE — ASSESSMENT & PLAN NOTE
Chronic. Had fibroids removed in 2017, recovery took about a year. She had no bleeding for about a year and bleeding started again in 2019, was anemic at that time around 7.1 HGB.     Established with GYN, Dr. Becerril. Recently started on COCP, has been on for about 1.5 weeks, bleeding stopped but now having very light spotting, not requiring a pad at this point.

## 2021-09-10 NOTE — PROGRESS NOTES
"Subjective:   Mariela Kerr is a 41 y.o. female here today for ER follow up    Fibroids  Chronic. Had fibroids removed in 2017, recovery took about a year. She had no bleeding for about a year and bleeding started again in 2019, was anemic at that time around 7.1 HGB.     Established with GYN, Dr. Becerril. Recently started on COCP, has been on for about 1.5 weeks, bleeding stopped but now having very light spotting, not requiring a pad at this point.        Current medicines (including changes today)  Current Outpatient Medications   Medication Sig Dispense Refill   • LO-ZUMANDIMINE 3-0.02 MG per tablet      • ferrous sulfate 325 (65 Fe) MG EC tablet Take 1 Tablet by mouth 3 times a day with meals. 90 Tablet 0   • therapeutic multivitamin-minerals (THERAGRAN-M) Tab Take 1 tablet by mouth every day.     • Loratadine (CLARITIN) 10 MG Cap Take  by mouth.       No current facility-administered medications for this visit.     She  has a past medical history of Endometriosis.    ROS   No chest pain, no shortness of breath, no abdominal pain  Positive ROS as per HPI.  All other systems reviewed and are negative.     Objective:     /76 (BP Location: Right arm, Patient Position: Sitting, BP Cuff Size: Adult)   Pulse 97   Temp 36.8 °C (98.2 °F) (Temporal)   Ht 1.575 m (5' 2\")   Wt 62.1 kg (137 lb)   SpO2 98%  Body mass index is 25.06 kg/m².     Physical Exam:  Constitutional: Alert, no distress.  Skin: Warm, dry, good turgor, no rashes in visible areas.  Eye: Equal, round and reactive, conjunctiva clear, lids normal.  ENMT: Mask in place  Respiratory: Unlabored respiratory effort  Psych: Alert and oriented x3, normal affect and mood.      Assessment and Plan:   The following treatment plan was discussed    1. Fibroids  Unstable  Continue COCP and iron supplement  Continue follow up with GYN to discuss fibroid removal vs hysterectomy    2. Iron deficiency anemia due to chronic blood " loss  Unstable  Continue COCP and iron supp  Repeat HGB in 2 weeks  - CBC WITH DIFFERENTIAL; Future    3. H/O menorrhagia  - CBC WITH DIFFERENTIAL; Future      Followup: Return if symptoms worsen or fail to improve.    I have placed the below orders and discussed them with an approved delegating provider. The MA is performing the below orders under the direction of Dr. Kumar

## 2021-09-16 NOTE — PROGRESS NOTES
Telemedicine Visit: Established Patient     This encounter was conducted via Zoom.   Verbal consent was obtained. Patient's identity was verified.    Subjective:   CC: leg cramps  Mariela Kerr is a 41 y.o. female presenting for evaluation and management of:    Leg cramps  Ongoing for a few months, worse around her period. Does have a history of anemia, had been chewing more ice so she restarted her iron supplement. She would like to get her labs checked.    She has tried drinking more water, standing/walking (makes it worse).     Has not tried electrolyte supplements, pickle juice, or magnesium supplements.    Happening while she is sleeping.        ROS   Positive ROS as per HPI. All other systems reviewed and are negative.    Allergies   Allergen Reactions   • Gluten Meal Hives, Itching, Rash and Swelling   • Lactose    • Whey        Current medicines (including changes today)  Current Outpatient Medications   Medication Sig Dispense Refill   • therapeutic multivitamin-minerals (THERAGRAN-M) Tab Take 1 tablet by mouth every day.     • Loratadine (CLARITIN) 10 MG Cap Take  by mouth.     • LO-ZUMANDIMINE 3-0.02 MG per tablet      • ferrous sulfate 325 (65 Fe) MG EC tablet Take 1 Tablet by mouth 3 times a day with meals. 90 Tablet 0     No current facility-administered medications for this visit.       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  She  has a past surgical history that includes myomectomy.       Objective:   /88 (BP Location: Other (Comment)) Comment (BP Location): right forearm  Pulse 87   Temp 36.8 °C (98.2 °F) (Temporal)   Ht 1.575 m (5' 2\")   Wt 60.3 kg (133 lb)   LMP 08/12/2021   SpO2 98%   BMI 24.33 kg/m²     Physical Exam:  Constitutional: Alert, no distress, well-groomed.  Skin: No rashes in visible areas.  Eye: Round. Conjunctiva clear, lids normal. No icterus.   ENMT: Lips pink without lesions, good dentition, moist mucous membranes. Phonation normal.  Neck: No masses, no thyromegaly. Moves freely without pain.  CV: Pulse as reported by patient  Respiratory: Unlabored respiratory effort, no cough or audible wheeze  Psych: Alert and oriented x3, normal affect and mood.       Assessment and Plan:   The following treatment plan was discussed:     1. Leg cramps  Unstable  Check labs  Concern for THOMAS  - Comp Metabolic Panel; Future  - CBC WITH DIFFERENTIAL; Future  - IRON/TOTAL IRON BIND; Future  - FERRITIN; Future    2. Annual physical exam  - Comp Metabolic Panel; Future  - CBC WITH DIFFERENTIAL; Future  - IRON/TOTAL IRON BIND; Future  - FERRITIN; Future    3. Screening examination for STD (sexually transmitted disease)  - HIV AG/AB COMBO ASSAY SCREENING; Future  - CHLAMYDIA/GC PCR URINE OR SWAB; Future        Follow-up: Return if symptoms worsen or fail to improve.            "

## 2021-09-22 ENCOUNTER — IMMUNIZATION (OUTPATIENT)
Dept: OCCUPATIONAL MEDICINE | Facility: CLINIC | Age: 41
End: 2021-09-22

## 2021-09-22 DIAGNOSIS — Z23 NEED FOR VACCINATION: Primary | ICD-10-CM

## 2021-09-22 PROCEDURE — 90686 IIV4 VACC NO PRSV 0.5 ML IM: CPT | Performed by: PREVENTIVE MEDICINE

## 2021-09-25 ENCOUNTER — HOSPITAL ENCOUNTER (OUTPATIENT)
Dept: LAB | Facility: MEDICAL CENTER | Age: 41
End: 2021-09-25
Attending: NURSE PRACTITIONER
Payer: COMMERCIAL

## 2021-09-25 DIAGNOSIS — Z87.42 H/O MENORRHAGIA: ICD-10-CM

## 2021-09-25 DIAGNOSIS — D50.0 IRON DEFICIENCY ANEMIA DUE TO CHRONIC BLOOD LOSS: ICD-10-CM

## 2021-09-25 LAB
BASOPHILS # BLD AUTO: 1.5 % (ref 0–1.8)
BASOPHILS # BLD: 0.07 K/UL (ref 0–0.12)
EOSINOPHIL # BLD AUTO: 0.07 K/UL (ref 0–0.51)
EOSINOPHIL NFR BLD: 1.5 % (ref 0–6.9)
ERYTHROCYTE [DISTWIDTH] IN BLOOD BY AUTOMATED COUNT: 70.2 FL (ref 35.9–50)
HCT VFR BLD AUTO: 41.8 % (ref 37–47)
HGB BLD-MCNC: 11.9 G/DL (ref 12–16)
IMM GRANULOCYTES # BLD AUTO: 0.01 K/UL (ref 0–0.11)
IMM GRANULOCYTES NFR BLD AUTO: 0.2 % (ref 0–0.9)
LYMPHOCYTES # BLD AUTO: 1.18 K/UL (ref 1–4.8)
LYMPHOCYTES NFR BLD: 24.6 % (ref 22–41)
MCH RBC QN AUTO: 22.8 PG (ref 27–33)
MCHC RBC AUTO-ENTMCNC: 28.5 G/DL (ref 33.6–35)
MCV RBC AUTO: 80.2 FL (ref 81.4–97.8)
MONOCYTES # BLD AUTO: 0.28 K/UL (ref 0–0.85)
MONOCYTES NFR BLD AUTO: 5.8 % (ref 0–13.4)
NEUTROPHILS # BLD AUTO: 3.18 K/UL (ref 2–7.15)
NEUTROPHILS NFR BLD: 66.4 % (ref 44–72)
NRBC # BLD AUTO: 0 K/UL
NRBC BLD-RTO: 0 /100 WBC
PLATELET # BLD AUTO: 489 K/UL (ref 164–446)
PMV BLD AUTO: 9.9 FL (ref 9–12.9)
RBC # BLD AUTO: 5.21 M/UL (ref 4.2–5.4)
WBC # BLD AUTO: 4.8 K/UL (ref 4.8–10.8)

## 2021-09-25 PROCEDURE — 85025 COMPLETE CBC W/AUTO DIFF WBC: CPT

## 2021-09-25 PROCEDURE — 36415 COLL VENOUS BLD VENIPUNCTURE: CPT

## 2021-10-13 ENCOUNTER — HOSPITAL ENCOUNTER (OUTPATIENT)
Facility: MEDICAL CENTER | Age: 41
End: 2021-10-13
Attending: PHYSICIAN ASSISTANT
Payer: COMMERCIAL

## 2021-10-13 ENCOUNTER — OFFICE VISIT (OUTPATIENT)
Dept: URGENT CARE | Facility: CLINIC | Age: 41
End: 2021-10-13
Payer: COMMERCIAL

## 2021-10-13 VITALS
RESPIRATION RATE: 16 BRPM | HEART RATE: 90 BPM | HEIGHT: 62 IN | OXYGEN SATURATION: 99 % | SYSTOLIC BLOOD PRESSURE: 110 MMHG | BODY MASS INDEX: 25.21 KG/M2 | WEIGHT: 137 LBS | TEMPERATURE: 97.9 F | DIASTOLIC BLOOD PRESSURE: 80 MMHG

## 2021-10-13 DIAGNOSIS — Z86.19 HISTORY OF STREPTOCOCCAL INFECTION: ICD-10-CM

## 2021-10-13 DIAGNOSIS — R30.0 DYSURIA: ICD-10-CM

## 2021-10-13 LAB
APPEARANCE UR: NORMAL
BILIRUB UR STRIP-MCNC: NEGATIVE MG/DL
COLOR UR AUTO: NORMAL
GLUCOSE UR STRIP.AUTO-MCNC: NEGATIVE MG/DL
INT CON NEG: NORMAL
INT CON POS: NORMAL
KETONES UR STRIP.AUTO-MCNC: NEGATIVE MG/DL
LEUKOCYTE ESTERASE UR QL STRIP.AUTO: NORMAL
NITRITE UR QL STRIP.AUTO: NEGATIVE
PH UR STRIP.AUTO: 5.5 [PH] (ref 5–8)
POC URINE PREGNANCY TEST: NEGATIVE
PROT UR QL STRIP: NORMAL MG/DL
RBC UR QL AUTO: NORMAL
SP GR UR STRIP.AUTO: 1.02
UROBILINOGEN UR STRIP-MCNC: 0.2 MG/DL

## 2021-10-13 PROCEDURE — 87077 CULTURE AEROBIC IDENTIFY: CPT

## 2021-10-13 PROCEDURE — 99214 OFFICE O/P EST MOD 30 MIN: CPT | Performed by: PHYSICIAN ASSISTANT

## 2021-10-13 PROCEDURE — 87086 URINE CULTURE/COLONY COUNT: CPT

## 2021-10-13 PROCEDURE — 81025 URINE PREGNANCY TEST: CPT | Performed by: PHYSICIAN ASSISTANT

## 2021-10-13 PROCEDURE — 87186 SC STD MICRODIL/AGAR DIL: CPT

## 2021-10-13 PROCEDURE — 81002 URINALYSIS NONAUTO W/O SCOPE: CPT | Performed by: PHYSICIAN ASSISTANT

## 2021-10-13 RX ORDER — FERROUS SULFATE 325(65) MG
TABLET ORAL
COMMUNITY
Start: 2021-09-01

## 2021-10-13 RX ORDER — AMOXICILLIN 875 MG/1
875 TABLET, COATED ORAL 2 TIMES DAILY
Qty: 10 TABLET | Refills: 0 | Status: SHIPPED | OUTPATIENT
Start: 2021-10-13 | End: 2021-10-18

## 2021-10-13 RX ORDER — PHENAZOPYRIDINE HYDROCHLORIDE 200 MG/1
200 TABLET, FILM COATED ORAL 3 TIMES DAILY
Qty: 6 TABLET | Refills: 0 | Status: SHIPPED | OUTPATIENT
Start: 2021-10-13 | End: 2021-10-15

## 2021-10-13 ASSESSMENT — ENCOUNTER SYMPTOMS
FLANK PAIN: 1
CHILLS: 0
FEVER: 0

## 2021-10-13 ASSESSMENT — FIBROSIS 4 INDEX: FIB4 SCORE: 1.09

## 2021-10-13 NOTE — PROGRESS NOTES
"Subjective:     Mariela Kerr  is a 41 y.o. female who presents for UTI (seen at , urgency, tingle sensation, x1wk)       UTI  Pertinent negatives include no chills or fever.   Patient returns to urgent care with continued ongoing dysuria, urgency and frequency with urination as well as incomplete bladder emptying sensation.  Patient was seen in urgent care on 8/31/21 for similar symptoms.  Urine culture grew out group C strep.  She was completed with a full course of Omnicef.  However, she reports no real improvement in symptoms and is concerned that she may not have cleared the infection that she had previously.  Patient reports increase in urinary symptoms over the past 2 days with new onset of mild left flank pain.  She has not had a fever.  Patient has had no nausea or vomiting.    Patient also has being managed by her primary care provider for fibroids and iron deficiency anemia due to blood loss with history of menorrhagia.  She reports continued abdominal cramping with worsening vaginal bleeding.  She denies weakness or dizziness.      Review of Systems   Constitutional: Negative for chills and fever.   Genitourinary: Positive for dysuria, flank pain, frequency and urgency.   All other systems reviewed and are negative.    Allergies   Allergen Reactions   • Gluten Meal Hives, Itching, Rash and Swelling   • Lactose    • Whey      Past medical history, family history, surgical history and social history are reviewed and updated in the record today.     Objective:   /80 (BP Location: Left arm, Patient Position: Sitting, BP Cuff Size: Adult)   Pulse 90   Temp 36.6 °C (97.9 °F) (Temporal)   Resp 16   Ht 1.575 m (5' 2\")   Wt 62.1 kg (137 lb)   SpO2 99%   BMI 25.06 kg/m²   Physical Exam  Vitals and nursing note reviewed.   Constitutional:       General: She is not in acute distress.     Appearance: She is well-developed. She is not ill-appearing or toxic-appearing.   HENT:      Head: " Normocephalic and atraumatic.      Right Ear: External ear normal.      Left Ear: External ear normal.      Nose: Nose normal.      Mouth/Throat:      Lips: Pink. No lesions.      Mouth: Mucous membranes are moist.      Pharynx: Oropharynx is clear. Uvula midline. No oropharyngeal exudate.   Eyes:      General: Lids are normal.      Extraocular Movements: Extraocular movements intact.      Conjunctiva/sclera: Conjunctivae normal.      Pupils: Pupils are equal, round, and reactive to light.   Cardiovascular:      Rate and Rhythm: Normal rate and regular rhythm.      Heart sounds: Normal heart sounds. No murmur heard.   No friction rub. No gallop.    Pulmonary:      Effort: Pulmonary effort is normal. No respiratory distress.      Breath sounds: Normal breath sounds.   Abdominal:      General: Bowel sounds are normal. There is no distension.      Palpations: Abdomen is soft. There is no mass.      Tenderness: There is abdominal tenderness in the suprapubic area. There is left CVA tenderness. There is no right CVA tenderness, guarding or rebound.      Comments: Very mild left CVA tenderness   Musculoskeletal:         General: No tenderness or deformity. Normal range of motion.      Cervical back: Normal range of motion and neck supple.   Lymphadenopathy:      Head:      Right side of head: No submental, submandibular or tonsillar adenopathy.      Left side of head: No submental, submandibular or tonsillar adenopathy.      Cervical: No cervical adenopathy.      Upper Body:      Right upper body: No supraclavicular adenopathy.      Left upper body: No supraclavicular adenopathy.   Skin:     General: Skin is warm and dry.      Findings: No rash.   Neurological:      Mental Status: She is alert and oriented to person, place, and time.      Cranial Nerves: Cranial nerves are intact. No cranial nerve deficit.      Sensory: Sensation is intact. No sensory deficit.      Motor: Motor function is intact.      Coordination:  Coordination is intact. Coordination normal.      Gait: Gait is intact.   Psychiatric:         Attention and Perception: Attention normal.         Mood and Affect: Mood and affect normal.         Speech: Speech normal.         Behavior: Behavior normal. Behavior is cooperative.         Thought Content: Thought content normal.         Judgment: Judgment normal.          Assessment/Plan:   1. Dysuria  - POCT Urinalysis  - POCT PREGNANCY  - Urine Culture; Future  - phenazopyridine (PYRIDIUM) 200 MG Tab; Take 1 Tablet by mouth 3 times a day for 2 days.  Dispense: 6 Tablet; Refill: 0  - amoxicillin (AMOXIL) 875 MG tablet; Take 1 Tablet by mouth 2 times a day for 5 days.  Dispense: 10 Tablet; Refill: 0    2. History of streptococcal infection    Urinalysis: Small leukocyte esterase, negative nitrites, large blood  Point-of-care pregnancy: Negative    Urinalysis is suggestive of UTI.  Given her recent urine culture positive for group C strep (previous urgent care visit note and lab results are reviewed by myself as part of today's visit) and no significant improvement with prior treatment patient is requesting treatment with penicillin-based medication which I believe is reasonable at this time pending urine culture and sensitivities.  Patient does have very minimal left CVA tenderness however she is afebrile here in clinic.  I do not believe we need to proceed with treatment for pyelonephritis at this time however, patient is given very strict red flag warning symptoms with ER/follow-up precautions.  Patient is also given Pyridium if needed.    Differential diagnosis, natural history, supportive care, and indications for immediate follow-up discussed.  Red flag warning symptoms and strict ER/follow-up precautions given.  The patient demonstrated a good understanding and agreed with the treatment plan.    Upon entering exam room I ensured patient was wearing a mask.  This provider wore appropriate PPE throughout entire  visit.  Patient wore mask entire visit except for a brief period while examining oropharynx.  Please note that this note was created using voice recognition speech to text software. Every effort has been made to correct obvious errors.  However, I expect there are errors of grammar and possibly context that were not discovered prior to finalizing the note  ABRAHAN Green PA-C    Addendum: correction of Group see to Group C.   Lima Green PA-C     [email or web address]

## 2021-10-16 ENCOUNTER — TELEPHONE (OUTPATIENT)
Dept: URGENT CARE | Facility: CLINIC | Age: 41
End: 2021-10-16

## 2021-10-16 DIAGNOSIS — N30.00 ACUTE CYSTITIS WITHOUT HEMATURIA: ICD-10-CM

## 2021-10-16 RX ORDER — NITROFURANTOIN 25; 75 MG/1; MG/1
100 CAPSULE ORAL 2 TIMES DAILY
Qty: 10 CAPSULE | Refills: 0 | Status: SHIPPED | OUTPATIENT
Start: 2021-10-16 | End: 2021-10-21

## 2021-10-16 NOTE — TELEPHONE ENCOUNTER
Attempted to contact patient with urine culture positive for UTI with E. coli (this is different than her previous UTI).  Will need to change medication to nitrofurantoin.  Prescription sent to pharmacy on record ([facility]).  Voicemail left with detailed information.  Callback number provided to call with additional questions.  Lima Green PA-C

## 2023-12-08 ENCOUNTER — OFFICE VISIT (OUTPATIENT)
Dept: MEDICAL GROUP | Facility: MEDICAL CENTER | Age: 43
End: 2023-12-08
Payer: COMMERCIAL

## 2023-12-08 VITALS
TEMPERATURE: 97 F | HEART RATE: 80 BPM | BODY MASS INDEX: 31.71 KG/M2 | OXYGEN SATURATION: 100 % | SYSTOLIC BLOOD PRESSURE: 118 MMHG | WEIGHT: 179 LBS | HEIGHT: 63 IN | DIASTOLIC BLOOD PRESSURE: 74 MMHG

## 2023-12-08 DIAGNOSIS — Z23 NEED FOR VACCINATION: ICD-10-CM

## 2023-12-08 DIAGNOSIS — R29.818 AURAS: ICD-10-CM

## 2023-12-08 DIAGNOSIS — Z00.00 ANNUAL PHYSICAL EXAM: ICD-10-CM

## 2023-12-08 DIAGNOSIS — E66.9 OBESITY (BMI 30-39.9): ICD-10-CM

## 2023-12-08 DIAGNOSIS — D50.0 IRON DEFICIENCY ANEMIA DUE TO CHRONIC BLOOD LOSS: ICD-10-CM

## 2023-12-08 DIAGNOSIS — Z11.59 NEED FOR HEPATITIS C SCREENING TEST: ICD-10-CM

## 2023-12-08 DIAGNOSIS — Z83.3 FAMILY HISTORY OF DIABETES MELLITUS: ICD-10-CM

## 2023-12-08 DIAGNOSIS — D21.9 FIBROIDS: ICD-10-CM

## 2023-12-08 PROCEDURE — 3078F DIAST BP <80 MM HG: CPT | Performed by: NURSE PRACTITIONER

## 2023-12-08 PROCEDURE — 3074F SYST BP LT 130 MM HG: CPT | Performed by: NURSE PRACTITIONER

## 2023-12-08 PROCEDURE — 99214 OFFICE O/P EST MOD 30 MIN: CPT | Mod: 25 | Performed by: NURSE PRACTITIONER

## 2023-12-08 PROCEDURE — 90715 TDAP VACCINE 7 YRS/> IM: CPT | Performed by: NURSE PRACTITIONER

## 2023-12-08 PROCEDURE — 90471 IMMUNIZATION ADMIN: CPT | Performed by: NURSE PRACTITIONER

## 2023-12-08 RX ORDER — SUMATRIPTAN 25 MG/1
TABLET, FILM COATED ORAL
COMMUNITY
Start: 2023-12-05

## 2023-12-08 RX ORDER — CEPHALEXIN 250 MG/1
CAPSULE ORAL
COMMUNITY
Start: 2023-12-05

## 2023-12-08 RX ORDER — DROSPIRENONE 4 MG/1
TABLET, FILM COATED ORAL
COMMUNITY
Start: 2023-10-31

## 2023-12-08 NOTE — ASSESSMENT & PLAN NOTE
Patient had a few episodes recently of left sided visual auras with some dizziness, no associated headache. Was seen by urgent care. Seen by her GYN and was switched to progesterone only pill.     Was prescribed imitrex but hasn't had symptoms since then.     Does have history of migraine with aura in the past and this aura was the same.

## 2023-12-08 NOTE — RESEARCH NOTE
Released HNP and MNASH consent forms to patient after receiving notification about interest in the studies via response to [place].

## 2023-12-08 NOTE — ASSESSMENT & PLAN NOTE
Chronic problem. Last HGB and iron level was worse. Due to chronic menorrhagia r/t fibroids/endometriosis.     Reports more exercise intolerance, shortness of breath with exertion, tachycardia with exercise.

## 2023-12-11 NOTE — PROGRESS NOTES
"Subjective:   Mariela Kerr is a 43 y.o. female here today for medication refills    Auras  Patient had a few episodes recently of left sided visual auras with some dizziness, no associated headache. Was seen by urgent care. Seen by her GYN and was switched to progesterone only pill.     Was prescribed imitrex but hasn't had symptoms since then.     Does have history of migraine with aura in the past and this aura was the same.     Iron deficiency anemia due to chronic blood loss  Chronic problem. Last HGB and iron level was worse. Due to chronic menorrhagia r/t fibroids/endometriosis.     Reports more exercise intolerance, shortness of breath with exertion, tachycardia with exercise.      Current medicines (including changes today)  Current Outpatient Medications   Medication Sig Dispense Refill    SLYND 4 MG Tab       SUMAtriptan (IMITREX) 25 MG Tab tablet       cephALEXin (KEFLEX) 250 MG Cap       FEROSUL 325 (65 Fe) MG tablet       therapeutic multivitamin-minerals (THERAGRAN-M) Tab Take 1 tablet by mouth every day.      Loratadine (CLARITIN) 10 MG Cap Take  by mouth.       No current facility-administered medications for this visit.     She  has a past medical history of Endometriosis.    ROS   No chest pain, no shortness of breath, no abdominal pain  Positive ROS as per HPI.  All other systems reviewed and are negative.     Objective:     /74   Pulse 80   Temp 36.1 °C (97 °F) (Temporal)   Ht 1.594 m (5' 2.75\")   Wt 81.2 kg (179 lb)   SpO2 100%  Body mass index is 31.96 kg/m².     Physical Exam:  Constitutional: Alert, no distress.  Skin: Warm, dry, good turgor, no rashes in visible areas.  Eye: Equal, round and reactive, conjunctiva clear, lids normal.  ENMT: Lips without lesions, good dentition  Respiratory: Unlabored respiratory effort  Psych: Alert and oriented x3, normal affect and mood.      Assessment and Plan:   The following treatment plan was discussed    1. Iron deficiency anemia " due to chronic blood loss  Unstable  Repeat iron studies and CBC to eval for worsening anemia  - IRON/TOTAL IRON BIND; Future  - FERRITIN; Future    2. Fibroids  Unstable  Continue follow up with GYN  Repeat pelvic US to eval for increase in fibroid size  - US-PELVIC COMPLETE (TRANSABDOMINAL/TRANSVAGINAL) (COMBO); Future    3. Auras  Stable, resolved  Imitrex as needed if symptoms return    4. Obesity (BMI 30-39.9)  - Patient identified as having weight management issue.  Appropriate orders and counseling given.    5. Annual physical exam  - CBC WITH DIFFERENTIAL; Future  - Comp Metabolic Panel; Future  - VITAMIN D,25 HYDROXY (DEFICIENCY); Future  - TSH WITH REFLEX TO FT4; Future  - Lipid Profile; Future  - HEMOGLOBIN A1C; Future  - IRON/TOTAL IRON BIND; Future  - FERRITIN; Future  - VITAMIN B12; Future    6. Family history of diabetes mellitus  - HEMOGLOBIN A1C; Future    7. Need for hepatitis C screening test  - HEP C VIRUS ANTIBODY; Future    8. Need for vaccination  - Tdap =>6yo IM        Followup: Return for Annual, Results Review.    The MA is performing the above orders under the direction of Dr. Kumar    Please note that this dictation was created using voice recognition software. I have made every reasonable attempt to correct obvious errors, but I expect that there are errors of grammar and possibly content that I did not discover before finalizing the note.

## 2024-01-02 ENCOUNTER — RESEARCH ENCOUNTER (OUTPATIENT)
Dept: RESEARCH | Facility: MEDICAL CENTER | Age: 44
End: 2024-01-02
Payer: COMMERCIAL

## 2024-01-02 NOTE — RESEARCH NOTE
Entered to create encounter, however encounter and consent forms had already been sent.    07-Nov-2020

## 2024-01-19 ENCOUNTER — OFFICE VISIT (OUTPATIENT)
Dept: MEDICAL GROUP | Facility: MEDICAL CENTER | Age: 44
End: 2024-01-19
Payer: COMMERCIAL

## 2024-01-19 ENCOUNTER — HOSPITAL ENCOUNTER (OUTPATIENT)
Dept: RADIOLOGY | Facility: MEDICAL CENTER | Age: 44
End: 2024-01-19
Attending: NURSE PRACTITIONER
Payer: COMMERCIAL

## 2024-01-19 VITALS
WEIGHT: 149 LBS | TEMPERATURE: 97 F | BODY MASS INDEX: 26.4 KG/M2 | OXYGEN SATURATION: 98 % | HEIGHT: 63 IN | SYSTOLIC BLOOD PRESSURE: 122 MMHG | HEART RATE: 64 BPM | DIASTOLIC BLOOD PRESSURE: 62 MMHG

## 2024-01-19 DIAGNOSIS — E78.2 MIXED HYPERLIPIDEMIA: ICD-10-CM

## 2024-01-19 DIAGNOSIS — Z00.00 ANNUAL PHYSICAL EXAM: ICD-10-CM

## 2024-01-19 DIAGNOSIS — R73.03 PREDIABETES: ICD-10-CM

## 2024-01-19 DIAGNOSIS — Z87.42 H/O MENORRHAGIA: ICD-10-CM

## 2024-01-19 DIAGNOSIS — D50.0 IRON DEFICIENCY ANEMIA DUE TO CHRONIC BLOOD LOSS: ICD-10-CM

## 2024-01-19 PROCEDURE — 99396 PREV VISIT EST AGE 40-64: CPT | Performed by: NURSE PRACTITIONER

## 2024-01-19 PROCEDURE — 3074F SYST BP LT 130 MM HG: CPT | Performed by: NURSE PRACTITIONER

## 2024-01-19 PROCEDURE — 3078F DIAST BP <80 MM HG: CPT | Performed by: NURSE PRACTITIONER

## 2024-01-19 ASSESSMENT — PATIENT HEALTH QUESTIONNAIRE - PHQ9: CLINICAL INTERPRETATION OF PHQ2 SCORE: 0

## 2024-01-19 NOTE — ASSESSMENT & PLAN NOTE
Chronic problem, due to heavy menses r/t fibroids, endometriosis, adenomyosis. Now normalized with supplementation and starting birth control.

## 2024-01-19 NOTE — ASSESSMENT & PLAN NOTE
Chronic. Now on SLYND which has significantly helped her heavy flow. Established with Dr. Beach GYN.

## 2024-01-19 NOTE — ASSESSMENT & PLAN NOTE
New problem, , . Has been eating more fatty and sugary foods over the holidays, prior lipids have been normal.     Family history MI/Stent in father in his 50s.

## 2024-01-19 NOTE — ASSESSMENT & PLAN NOTE
Social/Family: In relationship, 24 year old child  Work: Nurse  Diet: Well balanced diet, avoids gluten and cows dairy  Caffeine/Energy Drinks/Soda: Coffee daily  Exercise: cardio 30 mins and strength several days per week  Stress: Increased due to job change  Sleep: Occasional, happy mammoth sleeping aid.   Depression/Anxiety Concerns: Stable on Ashwaganda     Pap UTD with GYN  Mammogram UTD, ordered by GYN, April 1, 2023

## 2024-01-21 NOTE — PROGRESS NOTES
"Subjective:   Mariela Kerr is a 43 y.o. female here today for annual, lab review    H/O menorrhagia  Chronic. Now on SLYND which has significantly helped her heavy flow. Established with Dr. Beach GYN.     Annual physical exam  Social/Family: In relationship, 24 year old child  Work: Nurse  Diet: Well balanced diet, avoids gluten and cows dairy  Caffeine/Energy Drinks/Soda: Coffee daily  Exercise: cardio 30 mins and strength several days per week  Stress: Increased due to job change  Sleep: Occasional, happy mammoth sleeping aid.   Depression/Anxiety Concerns: Stable on Ashwaganda     Pap UTD with GYN  Mammogram UTD, ordered by GYN, April 1, 2023      Prediabetes  New problem today, A1C 5.7. Mother is diabetic. Has been eating more sweets over the holidays.     Mixed hyperlipidemia  New problem, , . Has been eating more fatty and sugary foods over the holidays, prior lipids have been normal.     Family history MI/Stent in father in his 50s.     Iron deficiency anemia due to chronic blood loss  Chronic problem, due to heavy menses r/t fibroids, endometriosis, adenomyosis. Now normalized with supplementation and starting birth control.      Current medicines (including changes today)  Current Outpatient Medications   Medication Sig Dispense Refill    SLYND 4 MG Tab       SUMAtriptan (IMITREX) 25 MG Tab tablet       cephALEXin (KEFLEX) 250 MG Cap       FEROSUL 325 (65 Fe) MG tablet       therapeutic multivitamin-minerals (THERAGRAN-M) Tab Take 1 tablet by mouth every day.      Loratadine (CLARITIN) 10 MG Cap Take  by mouth.       No current facility-administered medications for this visit.     She  has a past medical history of Endometriosis.    ROS   No chest pain, no shortness of breath, no abdominal pain  Positive ROS as per HPI.  All other systems reviewed and are negative.     Objective:     /62   Pulse 64   Temp 36.1 °C (97 °F) (Temporal)   Ht 1.594 m (5' 2.75\")   Wt 67.6 " kg (149 lb)   SpO2 98%  Body mass index is 26.6 kg/m².     Physical Exam:  Constitutional: Alert, no distress.  Skin: Warm, dry, good turgor, no rashes in visible areas.  Eye: Equal, round and reactive, conjunctiva clear, lids normal.  ENMT: Lips without lesions, good dentition, oropharynx clear.  Neck: Trachea midline, no masses, no thyromegaly. No cervical or supraclavicular lymphadenopathy  Respiratory: Unlabored respiratory effort, lungs clear to auscultation, no wheezes, no ronchi.  Cardiovascular: Normal S1, S2, no murmur, no edema.  Abdomen: Soft, non-tender, no masses, no hepatosplenomegaly.  Psych: Alert and oriented x3, normal affect and mood.        Assessment and Plan:   The following treatment plan was discussed    1. Annual physical exam  Annual labs and health maintenance reviewed and updated.   Patient and I discussed the importance of lifestyle changes, with particular emphasis on decreasing sugar and carbohydrate intake and increasing plant-based nutrition (for the purposes of weight loss, general health, and prevention of chronic illnesses), as well as regular cardiovascular exercise, proper sleep, and stress management. Patient verbalized understanding.      2. Prediabetes  Unstable  Diet, exercise, weight loss advised  Continue to monitor A1C at least annually    3. H/O menorrhagia  Stable on POP, continue follow up with GYN    4. Mixed hyperlipidemia  Unstable  Diet, exercise, weight loss  Repeat 3-6 months  - Lipid Profile; Future    5. Iron deficiency anemia due to chronic blood loss  Stable on iron supplement and POP to control menorrhagia  Advised taking iron supplement starting a few days prior to menses, through cycle, and 2 days after, then stopping for the remainder of the month.       Followup: Return in about 1 year (around 1/19/2025).    The MA is performing the above orders under the direction of Dr. Kumar    Please note that this dictation was created using voice recognition  software. I have made every reasonable attempt to correct obvious errors, but I expect that there are errors of grammar and possibly content that I did not discover before finalizing the note.

## 2024-02-09 DIAGNOSIS — Z00.6 RESEARCH STUDY PATIENT: ICD-10-CM

## 2024-02-15 ENCOUNTER — HOSPITAL ENCOUNTER (OUTPATIENT)
Dept: LAB | Facility: MEDICAL CENTER | Age: 44
End: 2024-02-15
Attending: NURSE PRACTITIONER
Payer: COMMERCIAL

## 2024-02-15 DIAGNOSIS — Z00.6 RESEARCH STUDY PATIENT: ICD-10-CM

## 2024-02-22 LAB
ELF SCORE: 9.94 PPM (ref 9.8–11.3)
RELATIVE RISK: ABNORMAL
RISK GROUP: ABNORMAL
RISK: 23.6 %

## 2024-03-07 LAB
APOB+LDLR+PCSK9 GENE MUT ANL BLD/T: NOT DETECTED
BRCA1+BRCA2 DEL+DUP + FULL MUT ANL BLD/T: NOT DETECTED
MLH1+MSH2+MSH6+PMS2 GN DEL+DUP+FUL M: NOT DETECTED

## 2024-05-16 ENCOUNTER — TELEMEDICINE (OUTPATIENT)
Dept: MEDICAL GROUP | Facility: MEDICAL CENTER | Age: 44
End: 2024-05-16
Payer: COMMERCIAL

## 2024-05-16 VITALS — BODY MASS INDEX: 26.4 KG/M2 | WEIGHT: 149 LBS | HEIGHT: 63 IN

## 2024-05-16 DIAGNOSIS — R94.5 ABNORMAL FINDING ON LIVER FUNCTION: ICD-10-CM

## 2024-05-16 DIAGNOSIS — R73.03 PREDIABETES: ICD-10-CM

## 2024-05-16 PROCEDURE — 99213 OFFICE O/P EST LOW 20 MIN: CPT | Mod: 95 | Performed by: NURSE PRACTITIONER

## 2024-05-28 PROBLEM — R94.5 ABNORMAL FINDING ON LIVER FUNCTION: Status: ACTIVE | Noted: 2024-05-28

## 2024-05-29 NOTE — PROGRESS NOTES
Telemedicine: Established Patient   This evaluation was conducted via Zoom using secure and encrypted videoconferencing technology. The patient was in a private location outside of their home in the state of [place].    The patient's identity was confirmed and verbal consent was obtained for this virtual visit.    Subjective:   CC: lab review  Mariela Kerr is a 43 y.o. female presenting for evaluation and management of:    Prediabetes  Newly diagnosed problem January 2024, A1C 5.7. Mother is diabetic. Had been eating more sweets over the holidays. Now has been working on dietary changes, would like to repeat her A1C to monitor progress.     Abnormal finding on liver function  Patient recently had ELF liver disease screening which indicated she is at moderate risk of developing liver disease in the next 10 years. No family history of liver problems. She does not consume alcohol in excess. Diagnosed with prediabetes earlier this year.        ROS   Positive ROS as per HPI. All other systems reviewed and are negative.    Allergies   Allergen Reactions    Gluten Meal Hives, Itching, Rash and Swelling    Lactose     Whey        Current medicines (including changes today)  Current Outpatient Medications   Medication Sig Dispense Refill    SLYND 4 MG Tab  (Patient not taking: Reported on 5/16/2024)      SUMAtriptan (IMITREX) 25 MG Tab tablet       cephALEXin (KEFLEX) 250 MG Cap       FEROSUL 325 (65 Fe) MG tablet       therapeutic multivitamin-minerals (THERAGRAN-M) Tab Take 1 tablet by mouth every day.      Loratadine (CLARITIN) 10 MG Cap Take  by mouth.       No current facility-administered medications for this visit.       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  She  has a past surgical history that includes myomectomy.       Objective:   Ht 1.594 m (5' 2.75\")   Wt 67.6 kg (149 lb)   BMI 26.60 kg/m²     Physical Exam:  Constitutional: Alert, no distress, well-groomed.  Skin: No rashes in visible areas.  Eye: Round. Conjunctiva clear, lids normal. No icterus.   ENMT: Lips pink without lesions, good dentition, moist mucous membranes. Phonation normal.  Neck: No masses, no thyromegaly. Moves freely without pain.  CV: Pulse as reported by patient  Respiratory: Unlabored respiratory effort, no cough or audible wheeze  Psych: Alert and oriented x3, normal affect and mood.       Assessment and Plan:   The following treatment plan was discussed:     1. Prediabetes  Unstable  Continue working on diet changes  Repeat A1C to monitor progress  - HEMOGLOBIN A1C; Future    2. Abnormal finding on liver function  Unstable  Will check fibrosure to eval for any current liver disease as well as check liver US  - ALBERTO FibroSure; Future  - US-RUQ; Future        Follow-up: Return for Results Review.          "

## 2024-05-29 NOTE — ASSESSMENT & PLAN NOTE
Newly diagnosed problem January 2024, A1C 5.7. Mother is diabetic. Had been eating more sweets over the holidays. Now has been working on dietary changes, would like to repeat her A1C to monitor progress.

## 2024-05-29 NOTE — ASSESSMENT & PLAN NOTE
Patient recently had ELF liver disease screening which indicated she is at moderate risk of developing liver disease in the next 10 years. No family history of liver problems. She does not consume alcohol in excess. Diagnosed with prediabetes earlier this year.

## 2024-06-18 ENCOUNTER — APPOINTMENT (OUTPATIENT)
Dept: RADIOLOGY | Facility: MEDICAL CENTER | Age: 44
End: 2024-06-18
Attending: NURSE PRACTITIONER
Payer: COMMERCIAL

## 2024-06-19 ENCOUNTER — APPOINTMENT (OUTPATIENT)
Dept: RADIOLOGY | Facility: MEDICAL CENTER | Age: 44
End: 2024-06-19
Attending: NURSE PRACTITIONER
Payer: COMMERCIAL

## 2024-08-15 ENCOUNTER — OFFICE VISIT (OUTPATIENT)
Dept: MEDICAL GROUP | Facility: MEDICAL CENTER | Age: 44
End: 2024-08-15
Payer: COMMERCIAL

## 2024-08-15 VITALS
SYSTOLIC BLOOD PRESSURE: 110 MMHG | WEIGHT: 155 LBS | TEMPERATURE: 97 F | HEIGHT: 63 IN | HEART RATE: 69 BPM | DIASTOLIC BLOOD PRESSURE: 62 MMHG | BODY MASS INDEX: 27.46 KG/M2 | OXYGEN SATURATION: 98 %

## 2024-08-15 DIAGNOSIS — R73.03 PREDIABETES: ICD-10-CM

## 2024-08-15 DIAGNOSIS — Z12.31 SCREENING MAMMOGRAM, ENCOUNTER FOR: ICD-10-CM

## 2024-08-15 DIAGNOSIS — E78.2 MIXED HYPERLIPIDEMIA: ICD-10-CM

## 2024-08-15 DIAGNOSIS — Z82.49 FAMILY HISTORY OF HEART DISEASE: ICD-10-CM

## 2024-08-15 DIAGNOSIS — R06.09 EXERTIONAL DYSPNEA: ICD-10-CM

## 2024-08-15 DIAGNOSIS — E66.9 OBESITY (BMI 30-39.9): ICD-10-CM

## 2024-08-15 PROCEDURE — 3078F DIAST BP <80 MM HG: CPT | Performed by: NURSE PRACTITIONER

## 2024-08-15 PROCEDURE — 3074F SYST BP LT 130 MM HG: CPT | Performed by: NURSE PRACTITIONER

## 2024-08-15 PROCEDURE — 99214 OFFICE O/P EST MOD 30 MIN: CPT | Performed by: NURSE PRACTITIONER

## 2024-08-15 NOTE — PROGRESS NOTES
"Subjective:     History of Present Illness  The patient presents for evaluation of multiple medical concerns.    She is seeking a baseline cardiac assessment due to her family history of heart disease. She experiences shortness of breath during exercise, which necessitates a pause in activity. This is accompanied by dizziness. Her resting heart rate typically reaches the 90s. She has prediabetes but does not smoke.     She plans to travel to [place] from 09/2024 to 12/2024 and to [facility] in 03/2025. She is considering a PET CT scan of her heart with Dr. Hussein's cardiology to evaluate for CVD.     Her last mammogram was performed on 04/01/2023, revealing dense breast tissue but no other abnormalities. She is under the care of Dr. Beach    She has endometriosis but has not gotten scanned yet because it is expensive.    FAMILY HISTORY  Her father had a triple bypass surgery recently. He is 78 years old. Both her parents have ectopy. Her mother had an ablation for PVCs. They are on statins and metoprolol. They have hypertension. Her mother is diabetic. Her father is also diabetic now. He was a smoker.      Current medicines (including changes today)  Current Outpatient Medications   Medication Sig Dispense Refill    SUMAtriptan (IMITREX) 25 MG Tab tablet       cephALEXin (KEFLEX) 250 MG Cap       FEROSUL 325 (65 Fe) MG tablet       therapeutic multivitamin-minerals (THERAGRAN-M) Tab Take 1 tablet by mouth every day.      Loratadine (CLARITIN) 10 MG Cap Take  by mouth.       No current facility-administered medications for this visit.     She  has a past medical history of Endometriosis.    ROS  No chest pain, no shortness of breath, no abdominal pain  Positive ROS as per HPI.  All other systems reviewed and are negative.     Objective:     /62   Pulse 69   Temp 36.1 °C (97 °F) (Temporal)   Ht 1.594 m (5' 2.75\")   Wt 70.3 kg (155 lb)   SpO2 98%  Body mass index is 27.68 kg/m².   Physical " Exam    Constitutional: Alert, no distress.  Skin: Warm, dry, good turgor, no rashes in visible areas.  Eye: Equal, round and reactive, conjunctiva clear, lids normal.  ENMT: Lips without lesions, good dentition  Respiratory: Unlabored respiratory effort  Psych: Alert and oriented x3, normal affect and mood.      Results  Imaging  Mammogram showed dense breast tissue but no other abnormalities.        Assessment and Plan:   The following treatment plan was discussed    Assessment & Plan  1. Cardiac risk assessment.  Given her family history of heart disease, prediabetes, and cholesterol, she is at an increased risk for cardiac events. However, her young age and lack of smoking history are positive factors. The potential for calcified plaque is low due to her age. A CT angiogram may be beneficial to assess for any arterial narrowing or soft plaque development. A referral will be made to Dr. Hussein for further evaluation. She is advised to repeat her labs and request Dr. Hussein to forward the results to me. She reports experiencing shortness of breath and dizziness during exercise, which warrants further investigation.    2. Health maintenance.  Her next mammogram is due on 01/2025. An order for a mammogram will be placed. Once the results are received, she will be informed.          ORDERS:  1. Family history of heart disease  - REFERRAL TO CARDIOLOGY    2. Prediabetes  - REFERRAL TO CARDIOLOGY    3. Mixed hyperlipidemia  - REFERRAL TO CARDIOLOGY    4. Obesity (BMI 30-39.9)  - REFERRAL TO CARDIOLOGY    5. Exertional dyspnea  - REFERRAL TO CARDIOLOGY    6. Screening mammogram, encounter for  - MA-SCREENING MAMMO BILAT W/CAD; Future          The MA is performing the above orders under the direction of Dr. Kumar    Please note that this dictation was created using voice recognition software. I have made every reasonable attempt to correct obvious errors, but I expect that there are errors of grammar and  possibly content that I did not discover before finalizing the note.      Attestation      Verbal consent was acquired by the patient to use [employer] ambient listening note generation during this visit Yes